# Patient Record
Sex: FEMALE | Race: BLACK OR AFRICAN AMERICAN | NOT HISPANIC OR LATINO | Employment: FULL TIME | URBAN - METROPOLITAN AREA
[De-identification: names, ages, dates, MRNs, and addresses within clinical notes are randomized per-mention and may not be internally consistent; named-entity substitution may affect disease eponyms.]

---

## 2018-06-12 ENCOUNTER — OFFICE VISIT (OUTPATIENT)
Dept: URGENT CARE | Facility: CLINIC | Age: 48
End: 2018-06-12
Payer: COMMERCIAL

## 2018-06-12 VITALS
WEIGHT: 178 LBS | HEIGHT: 68 IN | RESPIRATION RATE: 18 BRPM | SYSTOLIC BLOOD PRESSURE: 160 MMHG | OXYGEN SATURATION: 100 % | BODY MASS INDEX: 26.98 KG/M2 | DIASTOLIC BLOOD PRESSURE: 108 MMHG | HEART RATE: 107 BPM

## 2018-06-12 DIAGNOSIS — I10 HYPERTENSION, UNCONTROLLED: Primary | ICD-10-CM

## 2018-06-12 PROCEDURE — 3008F BODY MASS INDEX DOCD: CPT | Mod: CPTII,S$GLB,, | Performed by: FAMILY MEDICINE

## 2018-06-12 PROCEDURE — 99203 OFFICE O/P NEW LOW 30 MIN: CPT | Mod: S$GLB,,, | Performed by: FAMILY MEDICINE

## 2018-06-12 PROCEDURE — 3077F SYST BP >= 140 MM HG: CPT | Mod: CPTII,S$GLB,, | Performed by: FAMILY MEDICINE

## 2018-06-12 PROCEDURE — 3080F DIAST BP >= 90 MM HG: CPT | Mod: CPTII,S$GLB,, | Performed by: FAMILY MEDICINE

## 2018-06-12 RX ORDER — AMLODIPINE BESYLATE 5 MG/1
5 TABLET ORAL DAILY
COMMUNITY
End: 2018-06-12 | Stop reason: SDUPTHER

## 2018-06-12 RX ORDER — LOSARTAN POTASSIUM AND HYDROCHLOROTHIAZIDE 25; 100 MG/1; MG/1
1 TABLET ORAL DAILY
COMMUNITY
End: 2018-06-12 | Stop reason: SDUPTHER

## 2018-06-12 RX ORDER — AMLODIPINE BESYLATE 5 MG/1
5 TABLET ORAL DAILY
Qty: 10 TABLET | Refills: 0 | Status: SHIPPED | OUTPATIENT
Start: 2018-06-12 | End: 2018-06-22

## 2018-06-12 RX ORDER — ALPRAZOLAM 1 MG/1
1 TABLET ORAL 2 TIMES DAILY
COMMUNITY

## 2018-06-12 RX ORDER — LOSARTAN POTASSIUM AND HYDROCHLOROTHIAZIDE 25; 100 MG/1; MG/1
1 TABLET ORAL DAILY
Qty: 10 TABLET | Refills: 0 | Status: SHIPPED | OUTPATIENT
Start: 2018-06-12

## 2018-06-12 NOTE — PROGRESS NOTES
"Subjective:       Patient ID: Medina Barrett is a 47 y.o. female.    Vitals:  height is 5' 8" (1.727 m) and weight is 80.7 kg (178 lb). Her blood pressure is 160/108 (abnormal) and her pulse is 107. Her respiration is 18 and oxygen saturation is 100%.     Chief Complaint: Hypertension    Out of meds for 3 weeks - states her usual pcp went to [facility] only and she could not afford it, she could not get a refill either. She denies headache, sob, chest pain, blurred vision, extremity weakness, numbness, slurred speech. Her mother passed away 1 month ago so feeling very stressed about that, also has high stress job. Usually at home BP 170s/90s. She has an appt with a new PCP on June 20th at [facility].      Hypertension   Pertinent negatives include no blurred vision, chest pain, headaches or shortness of breath.     Review of Systems   Constitution: Negative for chills and fever.   HENT: Negative for sore throat.    Eyes: Negative for blurred vision.   Cardiovascular: Negative for chest pain.   Respiratory: Negative for shortness of breath.    Skin: Negative for rash.   Musculoskeletal: Negative for back pain and joint pain.   Gastrointestinal: Negative for abdominal pain, diarrhea, nausea and vomiting.   Neurological: Negative for headaches and light-headedness.   Psychiatric/Behavioral: The patient is nervous/anxious.        Objective:      Physical Exam   Constitutional: She is oriented to person, place, and time. She appears well-developed and well-nourished. She is cooperative.  Non-toxic appearance. She does not appear ill. No distress.   HENT:   Head: Normocephalic and atraumatic.   Right Ear: Hearing, tympanic membrane, external ear and ear canal normal. No middle ear effusion.   Left Ear: Hearing, tympanic membrane, external ear and ear canal normal.  No middle ear effusion.   Nose: Nose normal. No mucosal edema, rhinorrhea or nasal deformity. No epistaxis. Right sinus exhibits no maxillary sinus tenderness and no frontal " sinus tenderness. Left sinus exhibits no maxillary sinus tenderness and no frontal sinus tenderness.   Mouth/Throat: Uvula is midline, oropharynx is clear and moist and mucous membranes are normal. No trismus in the jaw. Normal dentition. No uvula swelling. No posterior oropharyngeal erythema.   Eyes: Conjunctivae, EOM and lids are normal. Pupils are equal, round, and reactive to light. Right eye exhibits no discharge. Left eye exhibits no discharge. No scleral icterus.   Sclera clear bilat   Neck: Trachea normal, normal range of motion, full passive range of motion without pain and phonation normal. Neck supple.   Cardiovascular: Normal rate, regular rhythm, normal heart sounds, intact distal pulses and normal pulses.    No murmur heard.  Pulmonary/Chest: Effort normal and breath sounds normal. No respiratory distress. She has no decreased breath sounds. She has no wheezes.   Abdominal: Soft. Normal appearance and bowel sounds are normal. She exhibits no distension, no pulsatile midline mass and no mass. There is no tenderness.   Musculoskeletal: Normal range of motion. She exhibits no edema or deformity.   Neurological: She is alert and oriented to person, place, and time. She has normal strength. No cranial nerve deficit or sensory deficit. She exhibits normal muscle tone. She displays a negative Romberg sign. Coordination and gait normal. GCS eye subscore is 4. GCS verbal subscore is 5. GCS motor subscore is 6.   Normal finger to nose, normal EOM, no nystagmus, normal sensation to face and extremities, CN intact,  strength equal bilateral, extremity strength equal bilaterally, no pronator drift   Skin: Skin is warm, dry and intact. She is not diaphoretic. No pallor.   Psychiatric: She has a normal mood and affect. Her speech is normal and behavior is normal. Judgment and thought content normal. Cognition and memory are normal.   Nursing note and vitals reviewed.      Assessment:       1. Hypertension,  uncontrolled        Plan:         Hypertension, uncontrolled  -     amLODIPine (NORVASC) 5 MG tablet; Take 1 tablet (5 mg total) by mouth once daily.  Dispense: 10 tablet; Refill: 0  -     losartan-hydrochlorothiazide 100-25 mg (HYZAAR) 100-25 mg per tablet; Take 1 tablet by mouth once daily.  Dispense: 10 tablet; Refill: 0    discussed bp log, diet, exercise  ER precautions discussed and pt understands    Patient Instructions     PLEASE READ YOUR DISCHARGE INSTRUCTIONS ENTIRELY AS IT CONTAINS IMPORTANT INFORMATION.    Take your blood pressure meds daily  Log your blood pressure at home - take at the same time everyday preferrably 1 hour after meds    Please go to the emergency room if you experience chest pain, shortness of breath, funny heart beats, headache, blurred vision, weakness in one arm or leg, slurred speech, numbness, inability to walk or talk, confusion.     Please keep your appt with your new doctor next week.    Eat low sodium diet, exercise.     You must understand that you have received an Urgent Care treatment only and that you may be released before all of your medical problems are known or treated.    Discharge Instructions: Taking Your Blood Pressure  Blood pressure is the force of blood as it moves from the heart through the blood vessels. You can take your own blood pressure reading using a digital monitor. Take readings as often as your healthcare provider instructs. Take your readings each time in the same way, using the same arm. Here are guidelines for taking your blood pressure.  The American Heart Association (AHA) recommends purchasing a blood pressure monitor that is validated and approved by the Association for the Advancement of Medical Instrumentation, the [place] Hypertension Society, and the International Protocol for the Validation of Automated BP Measuring Devices. If the blood pressure monitor is for a senior adult, a pregnant woman, or a child, make certain it is validated for  use with such a population. For the most reliable readings, the AHA recommends an automatic, cuff-style, upper arm (bicep) monitor. The readings from finger and wrist monitors are not as reliable as the upper arm monitor.        Step 1. Relax    · Wait at least a half hour after smoking, eating, or exercising. Do not drink coffee, tea, soda, or other caffeinated beverages before checking your blood pressure.   · Sit comfortably at a table. Place the monitor near you.  · Rest for a few minutes before you begin.        Step 2. Wrap the cuff    · Place your arm on the table, palm up. Put your arm in a position that is level with your heart. Wrap the cuff around your upper arm, about an inch above your elbow. Its best to wrap the cuff on bare skin, not over clothing.  · Make sure your cuff fits. If it doesnt wrap around your upper arm, order a larger cuff. A cuff that is too large or too small can result in an inaccurate blood pressure reading.           Step 3. Inflate the cuff    · Pump the cuff until the scale reads 200. If you have a self-inflating cuff, push the button that starts the pump.  · The cuff will tighten, then loosen.  · The numbers will change. When they stop changing, your blood pressure reading will appear.  · If you get a reading that is too high or too low for you, relax for a few minutes. Then do the test again.    Step 4. Write down the results  · Write down your blood pressure numbers. James the date and time. Keep your results in one place, such as a notebook.  · Remove the cuff from your arm. Turn off the machine.  · Take the readings with you to your medical appointments.  · If you start a new blood pressure medicine, or change a blood pressure medicine dose, note the day you started the new drug or dosage on your blood pressure recording sheet. This will help your healthcare provider monitor the effect of medication changes.     Date Last Reviewed: 4/27/2016  © 6517-3803 [employer]. [address] [place] [address], [place] [address]. All rights reserved. This information is not intended as a substitute for professional medical care. Always follow your healthcare professional's instructions.        Eating Heart-Healthy Foods  Eating has a big impact on your heart health. In fact, eating healthier can improve several of your heart risks at once. For instance, it helps you manage weight, cholesterol, and blood pressure. Here are ideas to help you make heart-healthy changes without giving up all the foods and flavors you love.  Getting started  · Talk with your health care provider about eating plans, such as the DASH or Mediterranean diet. You may also be referred to a dietitian.  · Change a few things at a time. Give yourself time to get used to a few eating changes before adding more.  · Work to create a tasty, healthy eating plan that you can stick to for the rest of your life.    Goals for healthy eating  Below are some tips to improve your eating habits:  · Limit saturated fats and trans fats. Saturated fats raise your levels of cholesterol, so keep these fats to a minimum. They are found in foods such as fatty meats, whole milk, cheese, and palm and coconut oils. Avoid trans fats because they lower good cholesterol as well as raise bad cholesterol. Trans fats are most often found in processed foods.  · Reduce sodium (salt) intake. Eating too much salt may increase your blood pressure. Limit your sodium intake to 2,300 milligrams (mg) per day, or less if your health care provider recommends it. Dining out less often and eating fewer processed foods are two great ways to decrease the amount of salt you consume.  · Managing calories. A calorie is a unit of energy. Your body burns calories for fuel, but if you eat more calories than your body burns, the extras are stored as fat. Your health care provider can help you create a diet plan to manage your calories. This will likely include  eating healthier foods as well as exercising regularly. To help you track your progress, keep a diary to record what you eat and how often you exercise.  Choose the right foods  Aim to make these foods staples of your diet. If you have diabetes, you may have different recommendations than what is listed here:  · Fruits and vegetable provide plenty of nutrients without a lot of calories. At meals, fill half your plate with these foods. Split the other half of your plate between whole grains and lean protein.  · Whole grains are high in fiber and rich in vitamins and nutrients. Good choices include whole-wheat bread, pasta, and brown rice.  · Lean proteins give you nutrition with less fat. Good choices include fish, skinless chicken, and beans.  · Low-fat or nonfat dairy provides nutrients without a lot of fat. Try low-fat or nonfat milk, cheese, or yogurt.  · Healthy fats can be good for you in small amounts. These are unsaturated fats, such as olive oil, nuts, and fish. Try to have at least 2 servings per week of fatty fish such as salmon, sardines, mackerel, rainbow trout, and albacore tuna. These contain omega-3 fatty acids, which are good for your heart. Flaxseed is another source of a heart-healthy fat.  More on heart healthy eating    Read food labels  Healthy eating starts at the grocery store. Be sure to pay attention to food labels on packaged foods. Look for products that are high in fiber and protein, and low in saturated fat, cholesterol, and sodium. Avoid products that contain trans fat. And pay close attention to serving size. For instance, if you plan to eat two servings, double all the numbers on the label.  Prepare food right  A key part of healthy cooking is cutting down on added fat and salt. Look on the internet for lower-fat, lower-sodium recipes. Also, try these tips:  · Remove fat from meat and skin from poultry before cooking.  · Skim fat from the surface of soups and sauces.  · Broilkassieil,  bake, steam, grill, and microwave food without added fats.  · Choose ingredients that spice up your food without adding calories, fat, or sodium. Try these items: horseradish, hot sauce, lemon, mustard, nonfat salad dressings, and vinegar. For salt-free herbs and spices, try basil, cilantro, cinnamon, pepper, and rosemary.  Date Last Reviewed: 6/25/2015 © 2000-2017 [employer]. [address] [place] [address]. All rights reserved. This information is not intended as a substitute for professional medical care. Always follow your healthcare professional's instructions.        Exercise for a Healthier Heart  You may wonder how you can improve the health of your heart. If youre thinking about exercise, youre on the right track. You dont need to become an athlete, but you do need a certain amount of brisk exercise to help strengthen your heart. If you have been diagnosed with a heart condition, your doctor may recommend exercise to help stabilize your condition. To help make exercise a habit, choose safe, fun activities.     Exercise with a friend. When activity is fun, you're more likely to stick with it.     Be sure to check with your healthcare provider before starting an exercise program.   Why exercise?  Exercising regularly offers many healthy rewards. It can help you do all of the following:  · Improve your blood cholesterol level to help prevent further heart trouble  · Lower your blood pressure to help prevent a stroke or heart attack  · Control diabetes, or reduce your risk of getting this disease  · Improve your heart and lung function  · Reach and maintain a healthy weight  · Make your muscles stronger and more limber so you can stay active  · Prevent falls and fractures by slowing the loss of bone mass (osteoporosis)  · Manage stress better  · Reduce your blood pressure  · Improve your sense of self and your body image  Exercise tips  Ease into your routine. Set small goals. Then  build on them.  Exercise on most days. Aim for a total of 150 or more minutes of moderate to  vigorous intensity activity each week. Consider 40 minutes, 3 to 4 times a week. For best results, activity should last for 40 minutes on average. It is OK to work up to the 40 minute period over time. Examples of moderate-intensity activity is walking 1 mile in 15 minutes or 30 to 45 minutes of yard work.  Step up your daily activity level. Along with your exercise program, try being more active throughout the day. Walk instead of drive. Do more household tasks or yard work.  Choose one or more activities you enjoy. Walking is one of the easiest things you can do. You can also try swimming, riding a bike, dancing, or taking an exercise class.  Stop exercising and call your doctor if you:  · Have chest pain or feel dizzy or lightheaded  · Feel burning, tightness, pressure, or heaviness in your chest, neck, shoulders, back, or arms  · Have unusual shortness of breath  · Have increased joint or muscle pain  · Have palpitations or an irregular heartbeat   Date Last Reviewed: 5/1/2016  © 8336-7555 [employer]. [address] [place] [address]. All rights reserved. This information is not intended as a substitute for professional medical care. Always follow your healthcare professional's instructions.        Step-by-Step  Checking Your Blood Pressure    Date Last Reviewed: 4/27/2016  © 7868-6597 [employer]. [address] [place] [address]. All rights reserved. This information is not intended as a substitute for professional medical care. Always follow your healthcare professional's instructions.

## 2018-06-12 NOTE — PATIENT INSTRUCTIONS
PLEASE READ YOUR DISCHARGE INSTRUCTIONS ENTIRELY AS IT CONTAINS IMPORTANT INFORMATION.    Take your blood pressure meds daily  Log your blood pressure at home - take at the same time everyday preferrably 1 hour after meds    Please go to the emergency room if you experience chest pain, shortness of breath, funny heart beats, headache, blurred vision, weakness in one arm or leg, slurred speech, numbness, inability to walk or talk, confusion.     Please keep your appt with your new doctor next week.    Eat low sodium diet, exercise.     You must understand that you have received an Urgent Care treatment only and that you may be released before all of your medical problems are known or treated.    Discharge Instructions: Taking Your Blood Pressure  Blood pressure is the force of blood as it moves from the heart through the blood vessels. You can take your own blood pressure reading using a digital monitor. Take readings as often as your healthcare provider instructs. Take your readings each time in the same way, using the same arm. Here are guidelines for taking your blood pressure.  The American Heart Association (AHA) recommends purchasing a blood pressure monitor that is validated and approved by the Association for the Advancement of Medical Instrumentation, the [place] Hypertension Society, and the International Protocol for the Validation of Automated BP Measuring Devices. If the blood pressure monitor is for a senior adult, a pregnant woman, or a child, make certain it is validated for use with such a population. For the most reliable readings, the AHA recommends an automatic, cuff-style, upper arm (bicep) monitor. The readings from finger and wrist monitors are not as reliable as the upper arm monitor.        Step 1. Relax    · Wait at least a half hour after smoking, eating, or exercising. Do not drink coffee, tea, soda, or other caffeinated beverages before checking your blood pressure.   · Sit comfortably at  a table. Place the monitor near you.  · Rest for a few minutes before you begin.        Step 2. Wrap the cuff    · Place your arm on the table, palm up. Put your arm in a position that is level with your heart. Wrap the cuff around your upper arm, about an inch above your elbow. Its best to wrap the cuff on bare skin, not over clothing.  · Make sure your cuff fits. If it doesnt wrap around your upper arm, order a larger cuff. A cuff that is too large or too small can result in an inaccurate blood pressure reading.           Step 3. Inflate the cuff    · Pump the cuff until the scale reads 200. If you have a self-inflating cuff, push the button that starts the pump.  · The cuff will tighten, then loosen.  · The numbers will change. When they stop changing, your blood pressure reading will appear.  · If you get a reading that is too high or too low for you, relax for a few minutes. Then do the test again.    Step 4. Write down the results  · Write down your blood pressure numbers. James the date and time. Keep your results in one place, such as a notebook.  · Remove the cuff from your arm. Turn off the machine.  · Take the readings with you to your medical appointments.  · If you start a new blood pressure medicine, or change a blood pressure medicine dose, note the day you started the new drug or dosage on your blood pressure recording sheet. This will help your healthcare provider monitor the effect of medication changes.     Date Last Reviewed: 4/27/2016  © 5569-3526 [employer]. [address] [place] [address], [place] [address]. All rights reserved. This information is not intended as a substitute for professional medical care. Always follow your healthcare professional's instructions.        Eating Heart-Healthy Foods  Eating has a big impact on your heart health. In fact, eating healthier can improve several of your heart risks at once. For instance, it helps you manage weight, cholesterol, and blood  pressure. Here are ideas to help you make heart-healthy changes without giving up all the foods and flavors you love.  Getting started  · Talk with your health care provider about eating plans, such as the DASH or Mediterranean diet. You may also be referred to a dietitian.  · Change a few things at a time. Give yourself time to get used to a few eating changes before adding more.  · Work to create a tasty, healthy eating plan that you can stick to for the rest of your life.    Goals for healthy eating  Below are some tips to improve your eating habits:  · Limit saturated fats and trans fats. Saturated fats raise your levels of cholesterol, so keep these fats to a minimum. They are found in foods such as fatty meats, whole milk, cheese, and palm and coconut oils. Avoid trans fats because they lower good cholesterol as well as raise bad cholesterol. Trans fats are most often found in processed foods.  · Reduce sodium (salt) intake. Eating too much salt may increase your blood pressure. Limit your sodium intake to 2,300 milligrams (mg) per day, or less if your health care provider recommends it. Dining out less often and eating fewer processed foods are two great ways to decrease the amount of salt you consume.  · Managing calories. A calorie is a unit of energy. Your body burns calories for fuel, but if you eat more calories than your body burns, the extras are stored as fat. Your health care provider can help you create a diet plan to manage your calories. This will likely include eating healthier foods as well as exercising regularly. To help you track your progress, keep a diary to record what you eat and how often you exercise.  Choose the right foods  Aim to make these foods staples of your diet. If you have diabetes, you may have different recommendations than what is listed here:  · Fruits and vegetable provide plenty of nutrients without a lot of calories. At meals, fill half your plate with these foods.  Split the other half of your plate between whole grains and lean protein.  · Whole grains are high in fiber and rich in vitamins and nutrients. Good choices include whole-wheat bread, pasta, and brown rice.  · Lean proteins give you nutrition with less fat. Good choices include fish, skinless chicken, and beans.  · Low-fat or nonfat dairy provides nutrients without a lot of fat. Try low-fat or nonfat milk, cheese, or yogurt.  · Healthy fats can be good for you in small amounts. These are unsaturated fats, such as olive oil, nuts, and fish. Try to have at least 2 servings per week of fatty fish such as salmon, sardines, mackerel, rainbow trout, and albacore tuna. These contain omega-3 fatty acids, which are good for your heart. Flaxseed is another source of a heart-healthy fat.  More on heart healthy eating    Read food labels  Healthy eating starts at the grocery store. Be sure to pay attention to food labels on packaged foods. Look for products that are high in fiber and protein, and low in saturated fat, cholesterol, and sodium. Avoid products that contain trans fat. And pay close attention to serving size. For instance, if you plan to eat two servings, double all the numbers on the label.  Prepare food right  A key part of healthy cooking is cutting down on added fat and salt. Look on the internet for lower-fat, lower-sodium recipes. Also, try these tips:  · Remove fat from meat and skin from poultry before cooking.  · Skim fat from the surface of soups and sauces.  · Broil, boil, bake, steam, grill, and microwave food without added fats.  · Choose ingredients that spice up your food without adding calories, fat, or sodium. Try these items: horseradish, hot sauce, lemon, mustard, nonfat salad dressings, and vinegar. For salt-free herbs and spices, try basil, cilantro, cinnamon, pepper, and rosemary.  Date Last Reviewed: 6/25/2015  © 6269-8105 [employer]. [address] [place] [address], [place] [address].  All rights reserved. This information is not intended as a substitute for professional medical care. Always follow your healthcare professional's instructions.        Exercise for a Healthier Heart  You may wonder how you can improve the health of your heart. If youre thinking about exercise, youre on the right track. You dont need to become an athlete, but you do need a certain amount of brisk exercise to help strengthen your heart. If you have been diagnosed with a heart condition, your doctor may recommend exercise to help stabilize your condition. To help make exercise a habit, choose safe, fun activities.     Exercise with a friend. When activity is fun, you're more likely to stick with it.     Be sure to check with your healthcare provider before starting an exercise program.   Why exercise?  Exercising regularly offers many healthy rewards. It can help you do all of the following:  · Improve your blood cholesterol level to help prevent further heart trouble  · Lower your blood pressure to help prevent a stroke or heart attack  · Control diabetes, or reduce your risk of getting this disease  · Improve your heart and lung function  · Reach and maintain a healthy weight  · Make your muscles stronger and more limber so you can stay active  · Prevent falls and fractures by slowing the loss of bone mass (osteoporosis)  · Manage stress better  · Reduce your blood pressure  · Improve your sense of self and your body image  Exercise tips  Ease into your routine. Set small goals. Then build on them.  Exercise on most days. Aim for a total of 150 or more minutes of moderate to  vigorous intensity activity each week. Consider 40 minutes, 3 to 4 times a week. For best results, activity should last for 40 minutes on average. It is OK to work up to the 40 minute period over time. Examples of moderate-intensity activity is walking 1 mile in 15 minutes or 30 to 45 minutes of yard work.  Step up your daily activity level.  Along with your exercise program, try being more active throughout the day. Walk instead of drive. Do more household tasks or yard work.  Choose one or more activities you enjoy. Walking is one of the easiest things you can do. You can also try swimming, riding a bike, dancing, or taking an exercise class.  Stop exercising and call your doctor if you:  · Have chest pain or feel dizzy or lightheaded  · Feel burning, tightness, pressure, or heaviness in your chest, neck, shoulders, back, or arms  · Have unusual shortness of breath  · Have increased joint or muscle pain  · Have palpitations or an irregular heartbeat   Date Last Reviewed: 5/1/2016  © 4681-4841 [employer]. [address] [place] [address]. All rights reserved. This information is not intended as a substitute for professional medical care. Always follow your healthcare professional's instructions.        Step-by-Step  Checking Your Blood Pressure    Date Last Reviewed: 4/27/2016  © 8617-2783 [employer]. [address] [place] [address]. All rights reserved. This information is not intended as a substitute for professional medical care. Always follow your healthcare professional's instructions.

## 2018-06-14 ENCOUNTER — TELEPHONE (OUTPATIENT)
Dept: URGENT CARE | Facility: CLINIC | Age: 48
End: 2018-06-14

## 2019-10-25 LAB
HBV SURFACE AG SERPL QL IA: NEGATIVE
HCV AB SER-ACNC: NEGATIVE
HEPATITIS A IGM: NEGATIVE
HEPATITIS B CORE AB IGM: NEGATIVE
HIV-1/HIV-2 AB SCREEN: NORMAL

## 2021-04-01 ENCOUNTER — IMMUNIZATION (OUTPATIENT)
Dept: INTERNAL MEDICINE | Facility: CLINIC | Age: 51
End: 2021-04-01
Payer: COMMERCIAL

## 2021-04-01 DIAGNOSIS — Z23 NEED FOR VACCINATION: Primary | ICD-10-CM

## 2021-04-01 PROCEDURE — 0011A COVID-19, MRNA, LNP-S, PF, 100 MCG/0.5 ML DOSE VACCINE: ICD-10-PCS | Mod: CV19,S$GLB,, | Performed by: INTERNAL MEDICINE

## 2021-04-01 PROCEDURE — 0011A COVID-19, MRNA, LNP-S, PF, 100 MCG/0.5 ML DOSE VACCINE: CPT | Mod: CV19,S$GLB,, | Performed by: INTERNAL MEDICINE

## 2021-04-01 PROCEDURE — 91301 COVID-19, MRNA, LNP-S, PF, 100 MCG/0.5 ML DOSE VACCINE: CPT | Mod: S$GLB,,, | Performed by: INTERNAL MEDICINE

## 2021-04-01 PROCEDURE — 91301 COVID-19, MRNA, LNP-S, PF, 100 MCG/0.5 ML DOSE VACCINE: ICD-10-PCS | Mod: S$GLB,,, | Performed by: INTERNAL MEDICINE

## 2021-04-29 ENCOUNTER — IMMUNIZATION (OUTPATIENT)
Dept: INTERNAL MEDICINE | Facility: CLINIC | Age: 51
End: 2021-04-29
Payer: COMMERCIAL

## 2021-04-29 DIAGNOSIS — Z23 NEED FOR VACCINATION: Primary | ICD-10-CM

## 2021-04-29 PROCEDURE — 91301 COVID-19, MRNA, LNP-S, PF, 100 MCG/0.5 ML DOSE VACCINE: CPT | Mod: S$GLB,,, | Performed by: INTERNAL MEDICINE

## 2021-04-29 PROCEDURE — 91301 COVID-19, MRNA, LNP-S, PF, 100 MCG/0.5 ML DOSE VACCINE: ICD-10-PCS | Mod: S$GLB,,, | Performed by: INTERNAL MEDICINE

## 2021-04-29 PROCEDURE — 0012A COVID-19, MRNA, LNP-S, PF, 100 MCG/0.5 ML DOSE VACCINE: ICD-10-PCS | Mod: CV19,S$GLB,, | Performed by: INTERNAL MEDICINE

## 2021-04-29 PROCEDURE — 0012A COVID-19, MRNA, LNP-S, PF, 100 MCG/0.5 ML DOSE VACCINE: CPT | Mod: CV19,S$GLB,, | Performed by: INTERNAL MEDICINE

## 2021-11-30 ENCOUNTER — IMMUNIZATION (OUTPATIENT)
Dept: INTERNAL MEDICINE | Facility: CLINIC | Age: 51
End: 2021-11-30
Payer: COMMERCIAL

## 2021-11-30 DIAGNOSIS — Z23 NEED FOR VACCINATION: Primary | ICD-10-CM

## 2021-11-30 PROCEDURE — 0064A COVID-19, MRNA, LNP-S, PF, 100 MCG/0.25 ML DOSE VACCINE (MODERNA BOOSTER): CPT | Mod: PBBFAC

## 2023-12-11 LAB
CHOLESTEROL, TOTAL: 262 (ref 100–199)
HDLC SERPL-MCNC: 102 MG/DL
LDLC SERPL CALC-MCNC: 145 MG/DL (ref 0–99)
TRIGL SERPL-MCNC: 90 MG/DL (ref 0–149)
VLDL CHOLESTEROL: 15 MG/DL (ref 5–40)

## 2024-02-21 LAB — CRC RECOMMENDATION EXT: NORMAL

## 2024-03-06 LAB
HPV MRNA E6/E7: NOT DETECTED
PAP RECOMMENDATION EXT: NORMAL
PAP SMEAR: NORMAL

## 2024-03-14 ENCOUNTER — PATIENT OUTREACH (OUTPATIENT)
Dept: ADMINISTRATIVE | Facility: HOSPITAL | Age: 54
End: 2024-03-14
Payer: COMMERCIAL

## 2024-03-14 RX ORDER — CLINDAMYCIN PHOSPHATE 10 MG/G
GEL TOPICAL
COMMUNITY
Start: 2023-11-28

## 2024-03-14 RX ORDER — HYDROCORTISONE 25 MG/G
OINTMENT TOPICAL
COMMUNITY
Start: 2023-11-28

## 2024-03-14 RX ORDER — CLINDAMYCIN HYDROCHLORIDE 300 MG/1
CAPSULE ORAL
COMMUNITY
Start: 2023-11-15

## 2024-03-14 RX ORDER — METOPROLOL SUCCINATE 100 MG/1
TABLET, EXTENDED RELEASE ORAL
COMMUNITY
Start: 2023-09-26

## 2024-03-14 RX ORDER — FLUOCINONIDE TOPICAL SOLUTION USP, 0.05% 0.5 MG/ML
SOLUTION TOPICAL
COMMUNITY
Start: 2023-06-18

## 2024-03-14 RX ORDER — TRAZODONE HYDROCHLORIDE 50 MG/1
1 TABLET ORAL NIGHTLY
COMMUNITY
Start: 2023-10-05

## 2024-03-14 RX ORDER — KETOCONAZOLE 20 MG/ML
SHAMPOO, SUSPENSION TOPICAL
COMMUNITY
Start: 2023-12-13

## 2024-03-14 RX ORDER — ALPRAZOLAM 0.5 MG/1
0.5 TABLET ORAL 3 TIMES DAILY PRN
COMMUNITY
Start: 2023-09-26

## 2024-03-14 RX ORDER — HYDROQUINONE 40 MG/G
CREAM TOPICAL
COMMUNITY
Start: 2024-02-22

## 2024-03-14 RX ORDER — LOSARTAN POTASSIUM 100 MG/1
100 TABLET ORAL NIGHTLY
COMMUNITY
Start: 2024-02-15

## 2024-03-14 RX ORDER — DOXYCYCLINE 100 MG/1
100 CAPSULE ORAL
COMMUNITY
Start: 2023-09-29

## 2024-03-14 RX ORDER — VILAZODONE HYDROCHLORIDE 40 MG/1
1 TABLET ORAL DAILY
COMMUNITY
Start: 2023-11-02

## 2024-03-14 RX ORDER — ERGOCALCIFEROL 1.25 MG/1
CAPSULE ORAL
COMMUNITY
Start: 2024-02-13

## 2024-03-14 RX ORDER — AMLODIPINE BESYLATE 10 MG/1
1 TABLET ORAL 2 TIMES DAILY
COMMUNITY
Start: 2023-09-26

## 2024-03-14 RX ORDER — CHOLECALCIFEROL (VITAMIN D3) 25 MCG
2 TABLET ORAL DAILY
COMMUNITY

## 2024-03-14 RX ORDER — CETIRIZINE HYDROCHLORIDE 10 MG/1
10 TABLET ORAL NIGHTLY
COMMUNITY
Start: 2023-12-19

## 2024-03-14 RX ORDER — LOSARTAN POTASSIUM 100 MG/1
1 TABLET ORAL NIGHTLY
COMMUNITY
Start: 2022-12-02

## 2024-03-14 RX ORDER — CLONIDINE HYDROCHLORIDE 0.2 MG/1
TABLET ORAL
COMMUNITY
Start: 2024-02-28

## 2024-03-14 RX ORDER — CLONIDINE HYDROCHLORIDE 0.2 MG/1
1 TABLET ORAL 2 TIMES DAILY
COMMUNITY
Start: 2023-06-23

## 2024-03-14 RX ORDER — VILAZODONE HYDROCHLORIDE 40 MG/1
40 TABLET ORAL
COMMUNITY
Start: 2023-12-04

## 2024-03-14 RX ORDER — MUPIROCIN 20 MG/G
OINTMENT TOPICAL
COMMUNITY
Start: 2023-11-24

## 2024-03-14 RX ORDER — ALPRAZOLAM 0.5 MG/1
0.5 TABLET ORAL 3 TIMES DAILY
COMMUNITY
Start: 2024-02-23

## 2024-03-14 RX ORDER — TRAZODONE HYDROCHLORIDE 50 MG/1
50 TABLET ORAL NIGHTLY
COMMUNITY
Start: 2023-12-11

## 2024-03-14 RX ORDER — AMLODIPINE BESYLATE 10 MG/1
TABLET ORAL
COMMUNITY
Start: 2024-02-28

## 2024-03-14 RX ORDER — SPIRONOLACTONE 100 MG/1
TABLET, FILM COATED ORAL
COMMUNITY
Start: 2024-03-08

## 2024-03-14 RX ORDER — CETIRIZINE HYDROCHLORIDE 10 MG/1
1 TABLET ORAL NIGHTLY
COMMUNITY
Start: 2023-07-05

## 2024-03-14 RX ORDER — METOPROLOL SUCCINATE 100 MG/1
100 TABLET, EXTENDED RELEASE ORAL
COMMUNITY
Start: 2023-09-26

## 2024-03-14 RX ORDER — ESCITALOPRAM OXALATE 20 MG/1
TABLET ORAL
COMMUNITY
Start: 2024-03-14

## 2024-04-19 ENCOUNTER — TELEPHONE (OUTPATIENT)
Dept: FAMILY MEDICINE | Facility: CLINIC | Age: 54
End: 2024-04-19
Payer: COMMERCIAL

## 2024-06-21 ENCOUNTER — OFFICE VISIT (OUTPATIENT)
Dept: FAMILY MEDICINE | Facility: CLINIC | Age: 54
End: 2024-06-21
Payer: COMMERCIAL

## 2024-06-21 ENCOUNTER — LAB VISIT (OUTPATIENT)
Dept: LAB | Facility: HOSPITAL | Age: 54
End: 2024-06-21
Attending: FAMILY MEDICINE
Payer: COMMERCIAL

## 2024-06-21 VITALS
TEMPERATURE: 98 F | BODY MASS INDEX: 27.46 KG/M2 | DIASTOLIC BLOOD PRESSURE: 96 MMHG | HEIGHT: 69 IN | SYSTOLIC BLOOD PRESSURE: 120 MMHG | WEIGHT: 185.44 LBS | HEART RATE: 73 BPM | OXYGEN SATURATION: 99 %

## 2024-06-21 DIAGNOSIS — M25.561 CHRONIC PAIN OF RIGHT KNEE: ICD-10-CM

## 2024-06-21 DIAGNOSIS — I10 HYPERTENSION, UNCONTROLLED: ICD-10-CM

## 2024-06-21 DIAGNOSIS — Z00.00 ANNUAL PHYSICAL EXAM: ICD-10-CM

## 2024-06-21 DIAGNOSIS — Z23 NEED FOR SHINGLES VACCINE: ICD-10-CM

## 2024-06-21 DIAGNOSIS — Z00.00 ANNUAL PHYSICAL EXAM: Primary | ICD-10-CM

## 2024-06-21 DIAGNOSIS — G89.29 CHRONIC PAIN OF RIGHT KNEE: ICD-10-CM

## 2024-06-21 LAB
ALBUMIN SERPL BCP-MCNC: 3.9 G/DL (ref 3.5–5.2)
ALP SERPL-CCNC: 51 U/L (ref 55–135)
ALT SERPL W/O P-5'-P-CCNC: 76 U/L (ref 10–44)
ANION GAP SERPL CALC-SCNC: 8 MMOL/L (ref 8–16)
AST SERPL-CCNC: 42 U/L (ref 10–40)
BASOPHILS # BLD AUTO: 0.02 K/UL (ref 0–0.2)
BASOPHILS NFR BLD: 0.6 % (ref 0–1.9)
BILIRUB SERPL-MCNC: 0.7 MG/DL (ref 0.1–1)
BUN SERPL-MCNC: 11 MG/DL (ref 6–20)
CALCIUM SERPL-MCNC: 9.3 MG/DL (ref 8.7–10.5)
CHLORIDE SERPL-SCNC: 102 MMOL/L (ref 95–110)
CHOLEST SERPL-MCNC: 232 MG/DL (ref 120–199)
CHOLEST/HDLC SERPL: 3.2 {RATIO} (ref 2–5)
CO2 SERPL-SCNC: 24 MMOL/L (ref 23–29)
CREAT SERPL-MCNC: 1.1 MG/DL (ref 0.5–1.4)
DIFFERENTIAL METHOD BLD: ABNORMAL
EOSINOPHIL # BLD AUTO: 0 K/UL (ref 0–0.5)
EOSINOPHIL NFR BLD: 1.1 % (ref 0–8)
ERYTHROCYTE [DISTWIDTH] IN BLOOD BY AUTOMATED COUNT: 12.9 % (ref 11.5–14.5)
EST. GFR  (NO RACE VARIABLE): >60 ML/MIN/1.73 M^2
ESTIMATED AVG GLUCOSE: 114 MG/DL (ref 68–131)
GLUCOSE SERPL-MCNC: 110 MG/DL (ref 70–110)
HBA1C MFR BLD: 5.6 % (ref 4–5.6)
HCT VFR BLD AUTO: 37.9 % (ref 37–48.5)
HDLC SERPL-MCNC: 73 MG/DL (ref 40–75)
HDLC SERPL: 31.5 % (ref 20–50)
HGB BLD-MCNC: 11.9 G/DL (ref 12–16)
IMM GRANULOCYTES # BLD AUTO: 0.01 K/UL (ref 0–0.04)
IMM GRANULOCYTES NFR BLD AUTO: 0.3 % (ref 0–0.5)
LDLC SERPL CALC-MCNC: 140.2 MG/DL (ref 63–159)
LYMPHOCYTES # BLD AUTO: 1.5 K/UL (ref 1–4.8)
LYMPHOCYTES NFR BLD: 43 % (ref 18–48)
MCH RBC QN AUTO: 29 PG (ref 27–31)
MCHC RBC AUTO-ENTMCNC: 31.4 G/DL (ref 32–36)
MCV RBC AUTO: 92 FL (ref 82–98)
MONOCYTES # BLD AUTO: 0.4 K/UL (ref 0.3–1)
MONOCYTES NFR BLD: 12.4 % (ref 4–15)
NEUTROPHILS # BLD AUTO: 1.5 K/UL (ref 1.8–7.7)
NEUTROPHILS NFR BLD: 42.6 % (ref 38–73)
NONHDLC SERPL-MCNC: 159 MG/DL
NRBC BLD-RTO: 1 /100 WBC
PLATELET # BLD AUTO: 404 K/UL (ref 150–450)
PMV BLD AUTO: 10.3 FL (ref 9.2–12.9)
POTASSIUM SERPL-SCNC: 4.1 MMOL/L (ref 3.5–5.1)
PROT SERPL-MCNC: 7.5 G/DL (ref 6–8.4)
RBC # BLD AUTO: 4.11 M/UL (ref 4–5.4)
SODIUM SERPL-SCNC: 134 MMOL/L (ref 136–145)
TRIGL SERPL-MCNC: 94 MG/DL (ref 30–150)
TSH SERPL DL<=0.005 MIU/L-ACNC: 1.34 UIU/ML (ref 0.4–4)
WBC # BLD AUTO: 3.56 K/UL (ref 3.9–12.7)

## 2024-06-21 PROCEDURE — 83036 HEMOGLOBIN GLYCOSYLATED A1C: CPT | Performed by: FAMILY MEDICINE

## 2024-06-21 PROCEDURE — 84443 ASSAY THYROID STIM HORMONE: CPT | Performed by: FAMILY MEDICINE

## 2024-06-21 PROCEDURE — 80061 LIPID PANEL: CPT | Performed by: FAMILY MEDICINE

## 2024-06-21 PROCEDURE — 85025 COMPLETE CBC W/AUTO DIFF WBC: CPT | Performed by: FAMILY MEDICINE

## 2024-06-21 PROCEDURE — 80053 COMPREHEN METABOLIC PANEL: CPT | Performed by: FAMILY MEDICINE

## 2024-06-21 PROCEDURE — 36415 COLL VENOUS BLD VENIPUNCTURE: CPT | Mod: PO | Performed by: FAMILY MEDICINE

## 2024-06-21 PROCEDURE — 99999 PR PBB SHADOW E&M-EST. PATIENT-LVL V: CPT | Mod: PBBFAC,,, | Performed by: FAMILY MEDICINE

## 2024-06-21 RX ORDER — SPIRONOLACTONE 100 MG/1
100 TABLET, FILM COATED ORAL DAILY
Qty: 90 TABLET | Refills: 1 | Status: SHIPPED | OUTPATIENT
Start: 2024-06-21

## 2024-06-21 RX ORDER — AMLODIPINE BESYLATE 10 MG/1
10 TABLET ORAL DAILY
Qty: 90 TABLET | Refills: 1 | Status: SHIPPED | OUTPATIENT
Start: 2024-06-21

## 2024-06-21 RX ORDER — VALSARTAN AND HYDROCHLOROTHIAZIDE 160; 12.5 MG/1; MG/1
1 TABLET, FILM COATED ORAL DAILY
Qty: 90 TABLET | Refills: 3 | Status: SHIPPED | OUTPATIENT
Start: 2024-06-21 | End: 2025-06-21

## 2024-06-21 NOTE — PROGRESS NOTES
"Routine Office Visit    Medina Ryan  1970  [id number]      Subjective     Medina is a 53 y.o. 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 She now has intermittent episodes of knee pain and swelling   Hypertension - Patient states she needs to have discussion about her blood pressure. She was prescribed metoprolol, amlodipine, losartan, hctz, clonidine and spirinolactone. She takes medication sometimes. She has white coat hypertension and thinks the doctor kept prescribing medication but when she takes all medications, she felt weak and dizzy.     Objective     Review of Systems   Constitutional:  Negative for chills and fever.   HENT:  Negative for congestion.    Eyes:  Negative for blurred vision.   Respiratory:  Negative for cough.    Cardiovascular:  Negative for chest pain.   Gastrointestinal:  Negative for abdominal pain, constipation, diarrhea, heartburn, nausea and vomiting.   Genitourinary:  Negative for dysuria.   Musculoskeletal:  Negative for myalgias.   Skin:  Negative for itching and rash.   Neurological:  Negative for dizziness and headaches.   Psychiatric/Behavioral:  Negative for depression.      BP (!) 120/96   Pulse 73   Temp 98.3 °F (36.8 °C) (Oral)   Ht 5' 9" (1.753 m)   Wt 84.1 kg (185 lb 6.5 oz)   SpO2 99%   BMI 27.38 kg/m²   Physical Exam  Constitutional:       Appearance: She is well-developed.   HENT:      Head: Normocephalic and atraumatic.   Eyes:      Conjunctiva/sclera: Conjunctivae normal.      Pupils: Pupils are equal, round, and reactive to light.   Cardiovascular:      Rate and Rhythm: Normal rate and regular rhythm.      Heart sounds: Normal heart sounds. No murmur heard.     No friction rub. No gallop.   Pulmonary:      Effort: No respiratory distress.      Breath sounds: " Normal breath sounds.   Abdominal:      General: Bowel sounds are normal. There is no distension.      Palpations: Abdomen is soft.      Tenderness: There is no abdominal tenderness.   Musculoskeletal:         General: Normal range of motion.      Cervical back: Normal range of motion and neck supple.   Lymphadenopathy:      Cervical: No cervical adenopathy.   Skin:     General: Skin is warm.   Neurological:      Mental Status: She is alert and oriented to person, place, and time.             Assessment     Health Maintenance         Date Due Completion Date    TETANUS VACCINE Never done ---    Hemoglobin A1c (Diabetic Prevention Screening) Never done ---    Shingles Vaccine (1 of 2) Never done ---    COVID-19 Vaccine (4 - 2023-24 season) 09/01/2023 11/30/2021    Mammogram 01/18/2024 1/18/2023    Influenza Vaccine (Season Ended) 09/01/2024 12/2/2022    Lipid Panel 12/11/2028 12/11/2023    Colorectal Cancer Screening 02/21/2029 2/21/2024    Cervical Cancer Screening 03/06/2029 3/6/2024              Problem List Items Addressed This Visit    None  Visit Diagnoses       Annual physical exam    -  Primary    Relevant Orders    CBC Auto Differential    Lipid Panel    Comprehensive Metabolic Panel    TSH    Hemoglobin A1C  I addressed all major concerns as it related to health maintenance.  All were ordered and scheduled based on the patients wishes.  Any additional health maintenance will be readdressed at the next physical if declined or deferred by the patient.       Modified visit - topics discussed outside of annual physical    ROS: lightheadedness   PE: as above   Hypertension, uncontrolled        Relevant Medications    amLODIPine (NORVASC) 10 MG tablet    spironolactone (ALDACTONE) 100 MG tablet    valsartan-hydrochlorothiazide (DIOVAN-HCT) 160-12.5 mg per tablet  Stop metoprolol   Stop clonidine   Bp check in 2 weeks       Need for shingles vaccine        Relevant Medications    varicella-zoster gE vac,2 of 2  SusR    Chronic pain of right knee        Relevant Orders    Ambulatory referral/consult to Orthopedics                  Follow up in about 6 months (around 12/21/2024), or if symptoms worsen or fail to improve.

## 2024-06-23 DIAGNOSIS — M25.561 RIGHT KNEE PAIN, UNSPECIFIED CHRONICITY: Primary | ICD-10-CM

## 2024-08-09 ENCOUNTER — E-VISIT (OUTPATIENT)
Dept: FAMILY MEDICINE | Facility: CLINIC | Age: 54
End: 2024-08-09
Payer: COMMERCIAL

## 2024-08-09 DIAGNOSIS — I10 HYPERTENSION, UNCONTROLLED: Primary | ICD-10-CM

## 2024-08-09 RX ORDER — ACETAMINOPHEN 500 MG
1 TABLET ORAL DAILY
Qty: 1 EACH | Refills: 0 | Status: SHIPPED | OUTPATIENT
Start: 2024-08-09 | End: 2024-08-12

## 2024-08-09 RX ORDER — OLMESARTAN MEDOXOMIL 20 MG/1
20 TABLET ORAL DAILY
Qty: 30 TABLET | Refills: 3 | Status: SHIPPED | OUTPATIENT
Start: 2024-08-09 | End: 2025-08-09

## 2024-08-09 NOTE — PROGRESS NOTES
Patient ID: Medina Ryan is a 53 y.o. female.    Chief Complaint: General Illness (Entered automatically based on patient selection in [facility].)    The patient initiated a request through [facility] on 2024 for evaluation and management with a chief complaint of General Illness (Entered automatically based on patient selection in [facility].)     I evaluated the questionnaire submission on 2024.    [facility] P[facility] Evisit [facility]-Medication    2024 10:14 AM CDT - Filed by Patient   What do you need help with? Other Concern   Do you agree to participate in an E-Visit? Yes   If you have any of the following symptoms, please present to your local emergency room or call 911:  I acknowledge   Are you pregnant, could you be pregnant, or are you breast feeding? None of the above   What is the main issue you would like addressed today? Something in Valsartan is working   Please describe your symptoms Since I started this meditation Ive been itching.   Where is your problem located? All over   How severe are your symptoms? Moderate   Have you had these symptoms before? No   How long have you been having these symptoms? For one to four weeks   Please list any medications or treatments you have used for your condition and indicate if it was effective or not. Cetetrine   What makes this feel better? Taking a shower   What makes this feel worse? Really annoying   Are these symptoms related to a condition that you currently have? No   Please describe any probable cause for these symptoms Not sure   Provide any additional information you feel is important.    Please attach any relevant images or files    Are you able to take your vital signs? No         No diagnosis found.     No orders of the defined types were placed in this encounter.     Medications Ordered This Encounter   Medications    blood pressure monitor Kit     Si kit by Misc.(Non-Drug; Combo Route) route Daily.     Dispense:  1 each     Refill:  0     olmesartan (BENICAR) 20 MG tablet     Sig: Take 1 tablet (20 mg total) by mouth once daily.     Dispense:  30 tablet     Refill:  3     .      Advised to monitor bp   Take for BP >140/90    Follow up if symptoms worsen or fail to improve.      E-Visit Time Tracking:    Day 1 Time (in minutes): 4  Day 2 Time (in minutes): 2    Total Time (in minutes): 6

## 2024-08-10 NOTE — TELEPHONE ENCOUNTER
Refill Routing Note   Medication(s) are not appropriate for processing by [facility] for the following reason(s):        Pharmacy requesting test strips and lancets; DEFER TO PCP    [facility] action(s):  Defer               Appointments  past 12m or future 3m with PCP    Date Provider   Last Visit   8/9/2024 Taylor Peters MD   Next Visit   1/6/2025 Tayolr Peters MD   ED visits in past 90 days: 0        Note composed:10:07 AM 08/10/2024

## 2024-08-12 VITALS — DIASTOLIC BLOOD PRESSURE: 70 MMHG | SYSTOLIC BLOOD PRESSURE: 125 MMHG

## 2024-08-12 RX ORDER — BLOOD-GLUCOSE METER
EACH MISCELLANEOUS
Qty: 1 EACH | Refills: 0 | Status: SHIPPED | OUTPATIENT
Start: 2024-08-12

## 2024-08-19 ENCOUNTER — E-VISIT (OUTPATIENT)
Dept: FAMILY MEDICINE | Facility: CLINIC | Age: 54
End: 2024-08-19
Payer: COMMERCIAL

## 2024-08-19 DIAGNOSIS — I10 HYPERTENSION, UNCONTROLLED: Primary | ICD-10-CM

## 2024-08-19 NOTE — PROGRESS NOTES
Patient ID: Medina Ryan is a 53 y.o. female.    Chief Complaint: General Illness (Entered automatically based on patient selection in [facility].)    The patient initiated a request through [employer] on 8/19/2024 for evaluation and management with a chief complaint of General Illness (Entered automatically based on patient selection in [employer].)     I evaluated the questionnaire submission on 8/19/2024.    [facility] Peq Evisit [facility]-Medication    8/19/2024 10:29 AM CDT - Filed by Patient   What do you need help with? Medication Management   Do you agree to participate in an E-Visit? Yes   If you have any of the following symptoms, please present to your local emergency room or call 911:  I acknowledge   Medication requests for narcotics will not be addressed via an E-Visit.  Please schedule an appointment. I acknowledge   Are you pregnant, could you be pregnant, or are you breast feeding? None of the above   Do you want to address a new or existing medication? I would like to address a medication I currently take   What is the main issue you would like addressed today? Acu-check   Would you like to change or continue your medication? Continue medication   What medication would you like to continue?  A question about what I really should be taking   Are you taking it as prescribed? Yes    What medical condition is the  medication intended to treat? High Blood pressure   Is the medication helping your condition? I don't know   Are you having any side effects from the medication? No   Provide any additional information you feel is important. Pharmacist advised me taking these two Blood pressure medication will make my potassium rise.   Please attach any relevant images or files    Are you able to take your vital signs? No         Encounter Diagnosis   Name Primary?    Hypertension, uncontrolled Yes        No orders of the defined types were placed in this encounter.           No follow-ups on file.      E-Visit Time  Tracking:    Day 1 Time (in minutes): 7    Total Time (in minutes): 7

## 2024-08-21 ENCOUNTER — CLINICAL SUPPORT (OUTPATIENT)
Dept: FAMILY MEDICINE | Facility: CLINIC | Age: 54
End: 2024-08-21
Payer: COMMERCIAL

## 2024-08-21 DIAGNOSIS — Z23 NEED FOR SHINGLES VACCINE: Primary | ICD-10-CM

## 2024-08-21 PROCEDURE — 99999 PR PBB SHADOW E&M-EST. PATIENT-LVL I: CPT | Mod: PBBFAC,,,

## 2024-08-21 PROCEDURE — 90471 IMMUNIZATION ADMIN: CPT | Mod: S$GLB,,, | Performed by: FAMILY MEDICINE

## 2024-08-21 PROCEDURE — 90750 HZV VACC RECOMBINANT IM: CPT | Mod: S$GLB,,, | Performed by: FAMILY MEDICINE

## 2024-10-09 ENCOUNTER — E-VISIT (OUTPATIENT)
Dept: FAMILY MEDICINE | Facility: CLINIC | Age: 54
End: 2024-10-09
Payer: COMMERCIAL

## 2024-10-09 DIAGNOSIS — F32.A ANXIETY AND DEPRESSION: Primary | ICD-10-CM

## 2024-10-09 DIAGNOSIS — F41.9 ANXIETY AND DEPRESSION: Primary | ICD-10-CM

## 2024-10-09 RX ORDER — VENLAFAXINE HYDROCHLORIDE 75 MG/1
75 CAPSULE, EXTENDED RELEASE ORAL DAILY
Qty: 30 CAPSULE | Refills: 11 | Status: SHIPPED | OUTPATIENT
Start: 2024-10-09 | End: 2025-10-09

## 2024-10-09 NOTE — PROGRESS NOTES
"Patient ID: Medina Ryan is a 53 y.o. female.    Chief Complaint: General Illness (Entered automatically based on patient selection in [employer].)    The patient initiated a request through [employer] on 10/9/2024 for evaluation and management with a chief complaint of General Illness (Entered automatically based on patient selection in [employer].)     I evaluated the questionnaire submission on 10/9/2024.    [facility] Peq Evisit Supergroup-Medication    10/9/2024 11:41 AM CDT - Filed by Patient   What do you need help with? Anxiety   Do you agree to participate in an E-Visit? Yes   If you have any of the following symptoms, please present to your local emergency room or call 911:  I acknowledge   Medication requests for narcotics will not be addressed via an E-Visit.  Please schedule an appointment. I acknowledge   Are you pregnant, could you be pregnant, or are you breast feeding? None of the above   What is the main issue you would like addressed today? Replace my lexipro  Medication   Fear of embarrassment causes me to avoid doing things or speaking to people. No   I avoid activities in which I am the center of attention. Yes   Being embarrassed or looking stupid are among my worst fears. No   I would like to address: Medication for Anxiety or Depression   By selecting "I understand," you acknowledge that the answers that you provide for this questionnaire may not be immediately viewed by your provider or your care team. If you are personally dealing with suicidal thoughts or a crisis, please consider contacting your provider's office.  If your provider is not available, please consider taking action by: calling 911 or the National Suicide Prevention Lifeline any day, any time at 1-382.960.4875 or texting SIGNS to 533849 for 24/7 anonymous, free crisis counseling. I understand   Over the last 2 weeks, how often have you been bothered by the following problems?   Little interest or pleasure in doing things Nearly " every day   Feeling down, depressed, or hopeless Several days   PHQ-2 Score (range: 0 - 6) 4 (Further screening recommended)   Feeling nervous, anxious, on edge Nearly everyday   Not being able to stop or control worrying Several days   Worrying too much about different things Several days   Trouble relaxing Several days   Being so restless that its hard to sit still Not at all   Becoming easily annoyed or irritable Several days   Feeling afraid as if something awful might happen Not at all   If you marked you are experiencing any of the aforementioned problems, how difficult have these made it for you to do your work, take care of things at home, or get along with other people? Somewhat difficult   TOTAL SCORE: (range: 0 - 21) 7   Do you want to address a new or existing medication? I would like to start a new medication that I do not already take   What is the name of the medication that you would like to start? Not sure i was taking Laisha pro no longer because it wasnt working for me   Have you taken a similar medication in the past? No   Why are you requesting this particular medication? I need something new    What medical condition is the  medication intended to treat? Anxiety,  really stressed and depression   Provide any additional information you feel is important. Just dealing with alot of stress with my job & family   Please attach any relevant images or files    Are you able to take your vital signs? Yes   Systolic Blood Pressure: 125   Diastolic Blood Pressure: 70   Weight: 175   Height: 70   Pulse: 68   Temperature: 97   Respiration rate: 17   Pulse Oxygen: 92         Encounter Diagnosis   Name Primary?    Anxiety and depression Yes        No orders of the defined types were placed in this encounter.     Medications Ordered This Encounter   Medications    venlafaxine (EFFEXOR XR) 75 MG 24 hr capsule     Sig: Take 1 capsule (75 mg total) by mouth once daily.     Dispense:  30 capsule     Refill:  11         No follow-ups on file.      E-Visit Time Tracking:    Day 1 Time (in minutes): 7    Total Time (in minutes): 7

## 2024-12-15 DIAGNOSIS — I10 HYPERTENSION, UNCONTROLLED: ICD-10-CM

## 2024-12-15 NOTE — TELEPHONE ENCOUNTER
No care due was identified.  [facility] Embedded Care Due Messages. Reference number: [id number].   12/15/2024 8:54:42 AM CST

## 2024-12-16 RX ORDER — OLMESARTAN MEDOXOMIL 20 MG/1
20 TABLET ORAL
Qty: 90 TABLET | Refills: 1 | Status: SHIPPED | OUTPATIENT
Start: 2024-12-16

## 2024-12-17 NOTE — TELEPHONE ENCOUNTER
Refill Decision Note   Medina Ryan  is requesting a refill authorization.  Brief Assessment and Rationale for Refill:  Approve     Medication Therapy Plan:  LOV 10/9/2024      Comments:     Note composed:7:01 PM 12/16/2024             Appointments     Last Visit   8/19/2024 Taylor Peters MD   Next Visit   1/6/2025 Taylor Peters MD

## 2025-01-06 ENCOUNTER — OFFICE VISIT (OUTPATIENT)
Dept: FAMILY MEDICINE | Facility: CLINIC | Age: 55
End: 2025-01-06
Payer: COMMERCIAL

## 2025-01-06 ENCOUNTER — LAB VISIT (OUTPATIENT)
Dept: LAB | Facility: HOSPITAL | Age: 55
End: 2025-01-06
Attending: FAMILY MEDICINE
Payer: COMMERCIAL

## 2025-01-06 VITALS
DIASTOLIC BLOOD PRESSURE: 100 MMHG | HEIGHT: 69 IN | OXYGEN SATURATION: 99 % | SYSTOLIC BLOOD PRESSURE: 150 MMHG | WEIGHT: 178.38 LBS | HEART RATE: 95 BPM | BODY MASS INDEX: 26.42 KG/M2 | TEMPERATURE: 98 F

## 2025-01-06 DIAGNOSIS — G89.29 CHRONIC PAIN OF RIGHT KNEE: ICD-10-CM

## 2025-01-06 DIAGNOSIS — M25.561 CHRONIC PAIN OF RIGHT KNEE: ICD-10-CM

## 2025-01-06 DIAGNOSIS — F32.A ANXIETY AND DEPRESSION: ICD-10-CM

## 2025-01-06 DIAGNOSIS — I10 HYPERTENSION, UNCONTROLLED: ICD-10-CM

## 2025-01-06 DIAGNOSIS — L30.9 ECZEMA, UNSPECIFIED TYPE: ICD-10-CM

## 2025-01-06 DIAGNOSIS — E78.5 HYPERLIPIDEMIA, UNSPECIFIED HYPERLIPIDEMIA TYPE: ICD-10-CM

## 2025-01-06 DIAGNOSIS — I10 HYPERTENSION, UNCONTROLLED: Primary | ICD-10-CM

## 2025-01-06 DIAGNOSIS — F41.9 ANXIETY AND DEPRESSION: ICD-10-CM

## 2025-01-06 LAB
ALBUMIN SERPL BCP-MCNC: 4.3 G/DL (ref 3.5–5.2)
ALP SERPL-CCNC: 57 U/L (ref 40–150)
ALT SERPL W/O P-5'-P-CCNC: 120 U/L (ref 10–44)
ANION GAP SERPL CALC-SCNC: 13 MMOL/L (ref 8–16)
AST SERPL-CCNC: 55 U/L (ref 10–40)
BILIRUB SERPL-MCNC: 0.5 MG/DL (ref 0.1–1)
BUN SERPL-MCNC: 19 MG/DL (ref 6–20)
CALCIUM SERPL-MCNC: 10.7 MG/DL (ref 8.7–10.5)
CHLORIDE SERPL-SCNC: 105 MMOL/L (ref 95–110)
CHOLEST SERPL-MCNC: 244 MG/DL (ref 120–199)
CHOLEST/HDLC SERPL: 3 {RATIO} (ref 2–5)
CO2 SERPL-SCNC: 22 MMOL/L (ref 23–29)
CREAT SERPL-MCNC: 1.2 MG/DL (ref 0.5–1.4)
EST. GFR  (NO RACE VARIABLE): 53.8 ML/MIN/1.73 M^2
GLUCOSE SERPL-MCNC: 107 MG/DL (ref 70–110)
HDLC SERPL-MCNC: 81 MG/DL (ref 40–75)
HDLC SERPL: 33.2 % (ref 20–50)
LDLC SERPL CALC-MCNC: 139.2 MG/DL (ref 63–159)
NONHDLC SERPL-MCNC: 163 MG/DL
POTASSIUM SERPL-SCNC: 4 MMOL/L (ref 3.5–5.1)
PROT SERPL-MCNC: 8.9 G/DL (ref 6–8.4)
SODIUM SERPL-SCNC: 140 MMOL/L (ref 136–145)
TRIGL SERPL-MCNC: 119 MG/DL (ref 30–150)
TSH SERPL DL<=0.005 MIU/L-ACNC: 2.91 UIU/ML (ref 0.4–4)

## 2025-01-06 PROCEDURE — 3080F DIAST BP >= 90 MM HG: CPT | Mod: CPTII,S$GLB,, | Performed by: FAMILY MEDICINE

## 2025-01-06 PROCEDURE — 1160F RVW MEDS BY RX/DR IN RCRD: CPT | Mod: CPTII,S$GLB,, | Performed by: FAMILY MEDICINE

## 2025-01-06 PROCEDURE — 99214 OFFICE O/P EST MOD 30 MIN: CPT | Mod: S$GLB,,, | Performed by: FAMILY MEDICINE

## 2025-01-06 PROCEDURE — 1159F MED LIST DOCD IN RCRD: CPT | Mod: CPTII,S$GLB,, | Performed by: FAMILY MEDICINE

## 2025-01-06 PROCEDURE — 3008F BODY MASS INDEX DOCD: CPT | Mod: CPTII,S$GLB,, | Performed by: FAMILY MEDICINE

## 2025-01-06 PROCEDURE — 80061 LIPID PANEL: CPT | Performed by: FAMILY MEDICINE

## 2025-01-06 PROCEDURE — 99999 PR PBB SHADOW E&M-EST. PATIENT-LVL III: CPT | Mod: PBBFAC,,, | Performed by: FAMILY MEDICINE

## 2025-01-06 PROCEDURE — 84443 ASSAY THYROID STIM HORMONE: CPT | Performed by: FAMILY MEDICINE

## 2025-01-06 PROCEDURE — 80053 COMPREHEN METABOLIC PANEL: CPT | Performed by: FAMILY MEDICINE

## 2025-01-06 PROCEDURE — 36415 COLL VENOUS BLD VENIPUNCTURE: CPT | Mod: PO | Performed by: FAMILY MEDICINE

## 2025-01-06 PROCEDURE — G2211 COMPLEX E/M VISIT ADD ON: HCPCS | Mod: S$GLB,,, | Performed by: FAMILY MEDICINE

## 2025-01-06 PROCEDURE — 4010F ACE/ARB THERAPY RXD/TAKEN: CPT | Mod: CPTII,S$GLB,, | Performed by: FAMILY MEDICINE

## 2025-01-06 PROCEDURE — 3077F SYST BP >= 140 MM HG: CPT | Mod: CPTII,S$GLB,, | Performed by: FAMILY MEDICINE

## 2025-01-06 RX ORDER — SPIRONOLACTONE 100 MG/1
100 TABLET, FILM COATED ORAL DAILY
Qty: 90 TABLET | Refills: 1 | Status: SHIPPED | OUTPATIENT
Start: 2025-01-06

## 2025-01-06 RX ORDER — TAZAROTENE 1 MG/G
CREAM TOPICAL NIGHTLY
COMMUNITY
Start: 2024-09-27

## 2025-01-06 RX ORDER — TRIAMCINOLONE ACETONIDE 1 MG/G
CREAM TOPICAL 2 TIMES DAILY
Qty: 28 G | Refills: 0 | Status: SHIPPED | OUTPATIENT
Start: 2025-01-06

## 2025-01-06 RX ORDER — IBUPROFEN 800 MG/1
800 TABLET ORAL EVERY 6 HOURS PRN
COMMUNITY
Start: 2024-10-31

## 2025-01-06 RX ORDER — OLMESARTAN MEDOXOMIL 20 MG/1
20 TABLET ORAL DAILY
Qty: 90 TABLET | Refills: 1 | Status: SHIPPED | OUTPATIENT
Start: 2025-01-06

## 2025-01-06 RX ORDER — VENLAFAXINE HYDROCHLORIDE 150 MG/1
150 CAPSULE, EXTENDED RELEASE ORAL DAILY
Qty: 90 CAPSULE | Refills: 3 | Status: SHIPPED | OUTPATIENT
Start: 2025-01-06 | End: 2026-01-06

## 2025-01-06 NOTE — PROGRESS NOTES
Routine Office Visit     Patient Name: Medina Ryan    : 1970  MRN: [id number]    Subjective     History of Present Illness    CHIEF COMPLAINT:  Hypertension - 6 month f/u   Right knee pain - chronic condition   Anxiety   Skin dermatitis / rash on face    HYPERTENSION:  She is currently on spironolactone and olmesartan for blood pressure management. She discontinued amlodipine due to hair thinning. She reports blood pressure elevations with certain foods, such as pork and pickles. She has not been exercising.    ANXIETY:  She experiences anxiety which may affect her blood pressure. She continues venlafaxine for management.    DERMATOLOGIC:  She reports skin discoloration that initially presented as a bug bite. Previous treatments including microdermabrasion provided minimal improvement. Hydroquinone was tried but found to be too strong. The condition is affecting her mood.    MEDICATIONS:  Current medications include spironolactone, olmesartan, venlafaxine, and biotin for hair and nail growth.    FAMILY HISTORY:  Her mother developed hypertension later in life. Her 39-year-old brother has a history of seizures and anxiety.      ROS:  General: no fever, no chills, no fatigue, no weight gain, no weight loss  Eyes: no vision changes, no redness, no discharge  ENT: no ear pain, no nasal congestion, no sore throat  Cardiovascular: no chest pain, no palpitations, no lower extremity edema  Respiratory: no cough, no shortness of breath  Gastrointestinal: no abdominal pain, no nausea, no vomiting, no diarrhea, no constipation, no blood in stool  Genitourinary: no dysuria, no hematuria, no frequency  Musculoskeletal: no joint pain, no muscle pain  Skin: no rash, no lesion  Neurological: no headache, no dizziness, no numbness, no tingling  Psychiatric: +anxiety, no depression, no sleep difficulty           Objective     BP (!) 150/100 (BP Location: Left arm, Patient Position: Sitting)   Pulse 95   Temp 98.3 °F  "(36.8 °C) (Oral)   Ht 5' 9" (1.753 m)   Wt 80.9 kg (178 lb 5.6 oz)   SpO2 99%   BMI 26.34 kg/m²   Physical Exam  Constitutional:       Appearance: She is well-developed.   HENT:      Head: Normocephalic and atraumatic.   Eyes:      Conjunctiva/sclera: Conjunctivae normal.      Pupils: Pupils are equal, round, and reactive to light.   Cardiovascular:      Rate and Rhythm: Normal rate and regular rhythm.      Heart sounds: Normal heart sounds. No murmur heard.     No friction rub. No gallop.   Pulmonary:      Effort: No respiratory distress.      Breath sounds: Normal breath sounds.   Abdominal:      General: Bowel sounds are normal. There is no distension.      Palpations: Abdomen is soft.      Tenderness: There is no abdominal tenderness.   Musculoskeletal:         General: Normal range of motion.      Cervical back: Normal range of motion and neck supple.   Lymphadenopathy:      Cervical: No cervical adenopathy.   Skin:     General: Skin is warm.          Neurological:      Mental Status: She is alert and oriented to person, place, and time.           Assessment     Assessment & Plan     Assessed blood pressure management, noting elevated readings (150/100) and potential risks of prolonged hypertension   Evaluated reported hair loss, considering potential causes including medication side effects, thyroid issues, and stress   Reviewed anxiety symptoms and current venlafaxine dosage, considering potential increase   Examined facial skin lesion, likely post-inflammatory hyperpigmentation from previous treatment attempts    HAIR LOSS:   Discussed various etiologies of hair loss, including medication side effects, thyroid dysfunction, weight loss, tension from zuleima, and stress.   Recommend the patient consider using biotin supplements for hair and nail growth.   Noted the patient reports hair thinning, possibly due to medication or other factors.    FOLLOW UP:   Follow up in 6 months with labs.         Problem " List Items Addressed This Visit          Psychiatric    Anxiety and depression    Relevant Medications    venlafaxine (EFFEXOR XR) 150 MG Cp24  ANXIETY:   Increased venlafaxine to 150 mg for anxiety management.   Acknowledged possibility of anxiety affecting blood pressure readings.   Noted the patient reports ongoing stress due to family health issues.         Cardiac/Vascular    Hyperlipidemia    Relevant Orders    Comprehensive Metabolic Panel    Lipid Panel    TSH    CBC Auto Differential    Comprehensive Metabolic Panel    Lipid Panel    Hemoglobin A1C    TSH   Planned to recheck cholesterol levels.   Ordered fasting lipid panel.      Hypertension, uncontrolled - Primary    Relevant Medications    spironolactone (ALDACTONE) 100 MG tablet    olmesartan (BENICAR) 20 MG tablet    Other Relevant Orders    Comprehensive Metabolic Panel    Lipid Panel    TSH    CBC Auto Differential    Comprehensive Metabolic Panel    Lipid Panel    Hemoglobin A1C    TSH   Explained risks associated with prolonged hypertension, including heart disease, myocardial infarction, cerebrovascular accidents, and renal complications.   Instructed the patient to monitor blood pressure at home regularly while on medications.   Continued olmesartan 20 mg, not currently on amlodipine    Advised the patient to take olmesartan in the morning.   Instructed the patient to contact the office in 1 week to report home blood pressure readings.   Noted current blood pressure is 150/100 mmHg, which is considered elevated.   Advised the patient to check blood pressure at home when not in a stressful situation.   Refilled spironolactone and olmesartan.   Suggested that if blood pressure decreases with exercise, the patient may be able to discontinue a medication.   Noted the patient observes certain foods trigger blood pressure increase.   Continued spironolactone.   Take spironolactone in the morning.   Recommend starting an exercise routine to potentially  lower blood pressure.         Orthopedic    Chronic pain of right knee    Relevant Medications    ibuprofen (ADVIL,MOTRIN) 800 MG tablet     Other Visit Diagnoses       Eczema, unspecified type        Relevant Medications    triamcinolone acetonide 0.1% (KENALOG) 0.1 % cream   Informed about potential skin lightening treatments and their effects, including risk of hypopigmentation.   Prescribed triamcinolone cream, to be applied daily (preferably during daytime) to facial lesion for 1 week.   Observed flat, discolored skin from a previous bite or scratch.   Recommend considering dermatologist consultation.                This note was generated with the assistance of ambient listening technology. Verbal consent was obtained by the patient and accompanying visitor(s) for the recording of patient appointment to facilitate this note. I attest to having reviewed and edited the generated note for accuracy, though some syntax or spelling errors may persist. Please contact the author of this note for any clarification.

## 2025-01-08 ENCOUNTER — PATIENT MESSAGE (OUTPATIENT)
Dept: FAMILY MEDICINE | Facility: CLINIC | Age: 55
End: 2025-01-08
Payer: COMMERCIAL

## 2025-01-08 DIAGNOSIS — E78.5 HYPERLIPIDEMIA, UNSPECIFIED HYPERLIPIDEMIA TYPE: Primary | ICD-10-CM

## 2025-01-16 ENCOUNTER — PATIENT MESSAGE (OUTPATIENT)
Dept: ADMINISTRATIVE | Facility: HOSPITAL | Age: 55
End: 2025-01-16
Payer: COMMERCIAL

## 2025-01-27 ENCOUNTER — E-VISIT (OUTPATIENT)
Dept: FAMILY MEDICINE | Facility: CLINIC | Age: 55
End: 2025-01-27
Payer: COMMERCIAL

## 2025-01-27 ENCOUNTER — PATIENT MESSAGE (OUTPATIENT)
Dept: FAMILY MEDICINE | Facility: CLINIC | Age: 55
End: 2025-01-27
Payer: COMMERCIAL

## 2025-01-27 VITALS — SYSTOLIC BLOOD PRESSURE: 126 MMHG | DIASTOLIC BLOOD PRESSURE: 87 MMHG

## 2025-01-27 DIAGNOSIS — F32.A ANXIETY AND DEPRESSION: Primary | ICD-10-CM

## 2025-01-27 DIAGNOSIS — F41.9 ANXIETY AND DEPRESSION: Primary | ICD-10-CM

## 2025-01-27 PROCEDURE — 99421 OL DIG E/M SVC 5-10 MIN: CPT | Mod: ,,, | Performed by: FAMILY MEDICINE

## 2025-01-31 ENCOUNTER — PATIENT MESSAGE (OUTPATIENT)
Dept: BEHAVIORAL HEALTH | Facility: CLINIC | Age: 55
End: 2025-01-31
Payer: COMMERCIAL

## 2025-01-31 ENCOUNTER — PATIENT MESSAGE (OUTPATIENT)
Dept: PSYCHIATRY | Facility: CLINIC | Age: 55
End: 2025-01-31
Payer: COMMERCIAL

## 2025-02-03 ENCOUNTER — PATIENT OUTREACH (OUTPATIENT)
Dept: ADMINISTRATIVE | Facility: HOSPITAL | Age: 55
End: 2025-02-03
Payer: COMMERCIAL

## 2025-02-04 ENCOUNTER — TELEPHONE (OUTPATIENT)
Dept: BEHAVIORAL HEALTH | Facility: CLINIC | Age: 55
End: 2025-02-04
Payer: COMMERCIAL

## 2025-02-04 NOTE — PROGRESS NOTES
CHW reached out to pt per Lilli Chew, PhD since patient reportedly was having technical issues in regards to her virtual [facility] intake on 2/6/2025 at 1:00pm. No answer, LVM and sent portal message to pls call.

## 2025-02-06 ENCOUNTER — OFFICE VISIT (OUTPATIENT)
Dept: PSYCHIATRY | Facility: CLINIC | Age: 55
End: 2025-02-06
Payer: COMMERCIAL

## 2025-02-06 DIAGNOSIS — F33.0 MDD (MAJOR DEPRESSIVE DISORDER), RECURRENT EPISODE, MILD: Primary | ICD-10-CM

## 2025-02-06 DIAGNOSIS — F41.1 GAD (GENERALIZED ANXIETY DISORDER): ICD-10-CM

## 2025-02-06 PROCEDURE — 90791 PSYCH DIAGNOSTIC EVALUATION: CPT | Mod: 95,,, | Performed by: PSYCHOLOGIST
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
## 2025-02-07 RX ORDER — PAROXETINE HYDROCHLORIDE 20 MG/1
20 TABLET, FILM COATED ORAL EVERY MORNING
Qty: 30 TABLET | Refills: 11 | Status: SHIPPED | OUTPATIENT
Start: 2025-02-07 | End: 2026-02-07

## 2025-02-07 NOTE — LETTER
February 7, 2025      [facility]  [address] [facility]  [place] [address]  Phone: [phone]  Fax: [phone]       Patient: Medina Ryan   YOB: 1970  Date of Visit: 02/07/2025    To Whom It May Concern:    King Ryan  was at [facility] on 02/07/2025. The patient may return to work/school on 2/10/2025 with no restrictions. Please excuse 2/5 - 2/7.  If you have any questions or concerns, or if I can be of further assistance, please do not hesitate to contact me.    Sincerely,        Taylor Peters MD

## 2025-02-10 NOTE — LETTER
February 10, 2025      [facility]  [address] [facility]  [place] [address]  Phone: [phone]  Fax: [phone]       Patient: Medina Ryan   YOB: 1970  Date of Visit: 02/10/2025    To Whom It May Concern:    King Ryan  was at [facility] on 02/10/2025. The patient may return to work/school on 2/14/2025 with no restrictions. Please excuse dates 2/11/2025 - 2/13/2025. If you have any questions or concerns, or if I can be of further assistance, please do not hesitate to contact me.    Sincerely,        Taylor Peters MD

## 2025-02-10 NOTE — PROGRESS NOTES
Patient ID: Medina Ryan is a 54 y.o. female.    Chief Complaint: General Illness (Entered automatically based on patient selection in [employer].)    The patient initiated a request through [employer] on 1/27/2025 for evaluation and management with a chief complaint of General Illness (Entered automatically based on patient selection in [employer].)     I evaluated the questionnaire submission on 1/27/2025.    [facility] Peq Evisit Supergroup-Chronic Conditions    2/6/2025 12:53 PM CST - Filed by Patient   What do you need help with? Other Concern   Do you agree to participate in an E-Visit? Yes   If you have any of the following symptoms, please present to your local emergency room or call 911:  I acknowledge   Do you have any of the following pregnancy-related conditions? None   What is the main issue you would like addressed today? Anxiety   Please describe your symptoms Feeling burnt out and anxious   Where is your problem located? Not sure exactly   How severe are your symptoms? Moderate   Have you had these symptoms before? Yes   How long have you been having these symptoms? For more than a month   Please list any medications or treatments you have used for your condition and indicate if it was effective or not. Xanax, & Venlafaine 150 mg   What makes this feel better? Once I get off of work and be able to do what or spending time with kids and grandchildren   What makes this feel worse? Alot going in my family little brothers sick and my aunt.  Loss my Mom to cancer in 2018 help take care of her and had to see her leave in a body bag buy the s was devastating.   Are these symptoms related to a condition that you currently have? Yes   What is the condition? Severe Anxiety/ Also having a insect bite on my face turned into a 1year and a half of seeing the dermatologist really discouraged   When were you last seen for this condition?    Please describe any probable cause for these symptoms I feel like I need a  break.  Im feeling stuck in one place.   Provide any additional information you feel is important.    Please attach any relevant images or files    Are you able to take your vital signs? Yes   Systolic Blood Pressure: 123   Diastolic Blood Pressure: 82   Weight: 176   Height:    Pulse:    Temperature:    Respiration rate:    Pulse Oxygen:          Encounter Diagnosis   Name Primary?    Anxiety and depression Yes        No orders of the defined types were placed in this encounter.     Medications Ordered This Encounter   Medications    paroxetine (PAXIL) 20 MG tablet     Sig: Take 1 tablet (20 mg total) by mouth every morning.     Dispense:  30 tablet     Refill:  11        No follow-ups on file.      E-Visit Time Tracking:    Day 1 Time (in minutes): 3  Day 2 Time (in minutes): 3  Day 4 Time (in minutes): 3    Total Time (in minutes): 9

## 2025-02-13 ENCOUNTER — PATIENT MESSAGE (OUTPATIENT)
Dept: PSYCHIATRY | Facility: CLINIC | Age: 55
End: 2025-02-13
Payer: COMMERCIAL

## 2025-02-13 ENCOUNTER — OFFICE VISIT (OUTPATIENT)
Dept: PSYCHIATRY | Facility: CLINIC | Age: 55
End: 2025-02-13
Payer: COMMERCIAL

## 2025-02-13 DIAGNOSIS — F33.0 MDD (MAJOR DEPRESSIVE DISORDER), RECURRENT EPISODE, MILD: Primary | ICD-10-CM

## 2025-02-13 DIAGNOSIS — F41.1 GAD (GENERALIZED ANXIETY DISORDER): ICD-10-CM

## 2025-02-13 PROCEDURE — 4010F ACE/ARB THERAPY RXD/TAKEN: CPT | Mod: CPTII,95,, | Performed by: PSYCHOLOGIST

## 2025-02-13 PROCEDURE — 90837 PSYTX W PT 60 MINUTES: CPT | Mod: 95,,, | Performed by: PSYCHOLOGIST

## 2025-02-13 NOTE — PROGRESS NOTES
[facility]  Individual Psychotherapy (PhD/LCSW)    2/13/2025    Therapeutic Intervention: Met with patient.  Outpatient - Behavior modifying psychotherapy 60 min - CPT code 58437    The patient location is: Patient's home/ Patient reported that her location at the time of this visit was in the [facility]     Visit type: Virtual visit with synchronous audio and video     Each patient to whom he or she provides medical services by telemedicine is: (1) informed of the relationship between the physician and patient and the respective role of any other health care provider with respect to management of the patient; and (2) notified that he or she may decline to receive medical services by telemedicine and may withdraw from such care at any time.     Chief complaint/reason for encounter: depression and anxiety     Interval history and content of current session: Patient presented casually dressed, alert, and oriented.  Patient reported experiencing depressed mood, diminished interest, insomnia, poor appetite, concentration problems, tearfulness, social isolation, decreased memory, excessive anxiety/worry, restlessness/keyed up, irritability, and muscle tension.  Patient denied current suicidal and homicidal ideations.  Active listening skills were used as patient described feeling that she has loss of motivation and would like to focus on her wellbeing.  Educated patient about therapy/CBT and the [facility] as well as reviewed the STeP agreement/consent with patient.  Patient stated her understanding and agreement.  Explored treatment goals and plans.      [facility], Session 1 (Treatment Initiation)   Session Focus:   Brief check-in   Set agenda   Collect rating scales   MARCI-7 Score: (Patient-Rptd) (P) 8  Interpretation: (Patient-Rptd) (P) Mild Anxiety   PHQ8 Score : (Patient-Rptd) (P) 4  PHQ8 Interpretation: (Patient-Rptd) (P) Minimal or None  Introduction to Therapy and CBT   Treatment Plan/Goals   Review  Values and Aspirations   Address patient concerns   Summarize session   Feedback about session      Action Plan:   Review therapy materials   Intervention practice:    Use Cognitive-Behavioral Model Diagram for 3 situations   Complete the Bridging Sessions worksheet for Session 2   Ways to overcome obstacles: dameon brown      02/13/2025    Overall Treatment Plan: Reduce symptoms of depression and anxiety    Values and Aspirations: _____    Problem List / Patients Goals and Evidence-Based Interventions (include up to 5):    Problem #1: Increased anxiousness  Goal: Reduce Anxiety  Therapy Interventions: Relaxation skills, mindfulness, problem solving  Non-Therapy Strategies: Physical exercise, read books, stop rushing   Problem #2: Depressed mood  Goal: Alleviate depressed mood   Therapy Interventions: Behavioral activation, increase social engagement  Non-Therapy Strategies: write in a journal, self care activities   Problem #3: Difficulty expressing self  Goal: Improve communication skills  Therapy Interventions: Assertive communication, set boundaries  Non-Therapy Strategies: increase social involvement, dating   Problem #4:   Goal: ___   Therapy Interventions: ___   Non-Therapy Strategies: ___    Problem #5:   Goal: ___   Therapy Interventions: ___   Non-Therapy Strategies: ___        Treatment plan:  Target symptoms: depression, anxiety   Why chosen therapy is appropriate versus another modality: relevant to diagnosis  Outcome monitoring methods: self-report  Therapeutic intervention type: behavior modifying psychotherapy    Risk parameters:  Patient reports no suicidal ideation  Patient reports no homicidal ideation  Patient reports no self-injurious behavior  Patient reports no violent behavior    Verbal deficits: None    Patient's response to intervention:  The patient's response to intervention is accepting.    Progress toward goals and other mental status changes:  The patient's progress toward goals  is fair .    Diagnosis:     ICD-10-CM ICD-9-CM   1. MDD (major depressive disorder), recurrent episode, mild  F33.0 296.31   2. MARCI (generalized anxiety disorder)  F41.1 300.02       Plan:  individual psychotherapy, consult psychiatrist for medication evaluation, and medication management by physician     Return to clinic: 1 week    Length of Service (minutes): 60

## 2025-02-17 ENCOUNTER — TELEPHONE (OUTPATIENT)
Dept: PSYCHIATRY | Facility: CLINIC | Age: 55
End: 2025-02-17
Payer: COMMERCIAL

## 2025-02-25 ENCOUNTER — TELEPHONE (OUTPATIENT)
Dept: PSYCHIATRY | Facility: CLINIC | Age: 55
End: 2025-02-25
Payer: COMMERCIAL

## 2025-02-25 ENCOUNTER — OFFICE VISIT (OUTPATIENT)
Dept: PSYCHIATRY | Facility: CLINIC | Age: 55
End: 2025-02-25
Payer: COMMERCIAL

## 2025-02-25 DIAGNOSIS — F33.0 MDD (MAJOR DEPRESSIVE DISORDER), RECURRENT EPISODE, MILD: Primary | ICD-10-CM

## 2025-02-25 DIAGNOSIS — F41.1 GAD (GENERALIZED ANXIETY DISORDER): ICD-10-CM

## 2025-02-25 PROCEDURE — 4010F ACE/ARB THERAPY RXD/TAKEN: CPT | Mod: CPTII,95,, | Performed by: PSYCHOLOGIST

## 2025-02-25 PROCEDURE — 90837 PSYTX W PT 60 MINUTES: CPT | Mod: 95,,, | Performed by: PSYCHOLOGIST

## 2025-02-25 NOTE — PROGRESS NOTES
Individual Psychotherapy (PhD/LCSW)    2/25/2025    Therapeutic Intervention: Met with patient.  Outpatient - Behavior modifying psychotherapy 60 min - CPT code 93941    The patient location is: Patient's home/ Patient reported that her location at the time of this visit was in the [facility]     Visit type: Virtual visit with synchronous audio and video     Each patient to whom he or she provides medical services by telemedicine is: (1) informed of the relationship between the physician and patient and the respective role of any other health care provider with respect to management of the patient; and (2) notified that he or she may decline to receive medical services by telemedicine and may withdraw from such care at any time.     Chief complaint/reason for encounter: depression and anxiety     Interval history and content of current session: Patient presented casually dressed, alert, and oriented.  Patient reported experiencing depressed mood, diminished interest, insomnia, poor appetite, concentration problems, tearfulness, social isolation, decreased memory, excessive anxiety/worry, restlessness/keyed up, irritability, and muscle tension.  Patient denied current suicidal and homicidal ideations.  Explored triggers of patient's anxiety.  Patient described that she worries about issues related to family, personal safety, health, and finances, among other things.  Active listening skills were used as patient described feeling like she is put in the middle of discord between her adult children.  Assisted patient in processing her feelings around her strained relationship with her middle son.  She also discussed recent problems she has experienced in dating.      Treatment plan:  Target symptoms: depression, anxiety   Why chosen therapy is appropriate versus another modality: relevant to diagnosis  Outcome monitoring methods: self-report  Therapeutic intervention type: behavior modifying  psychotherapy    Risk parameters:  Patient reports no suicidal ideation  Patient reports no homicidal ideation  Patient reports no self-injurious behavior  Patient reports no violent behavior    Verbal deficits: None    Patient's response to intervention:  The patient's response to intervention is accepting.    Progress toward goals and other mental status changes:  The patient's progress toward goals is fair .    Diagnosis:     ICD-10-CM ICD-9-CM   1. MDD (major depressive disorder), recurrent episode, mild  F33.0 296.31   2. MARCI (generalized anxiety disorder)  F41.1 300.02       Plan:  individual psychotherapy, consult psychiatrist for medication evaluation, and medication management by physician     Return to clinic: 2 weeks    Length of Service (minutes): 60

## 2025-02-25 NOTE — TELEPHONE ENCOUNTER
The patient called the BEBP line because she was confused about scheduling follow-up with Dr. Chew. She noted that Dr. Chew cancelled her subsequent STeP sessions and told her to schedule into a follow-up slot for more supportive therapy.

## 2025-03-15 ENCOUNTER — E-VISIT (OUTPATIENT)
Dept: FAMILY MEDICINE | Facility: CLINIC | Age: 55
End: 2025-03-15
Payer: COMMERCIAL

## 2025-03-15 DIAGNOSIS — F41.9 ANXIETY AND DEPRESSION: ICD-10-CM

## 2025-03-15 DIAGNOSIS — F32.A ANXIETY AND DEPRESSION: ICD-10-CM

## 2025-03-17 RX ORDER — PAROXETINE 30 MG/1
30 TABLET, FILM COATED ORAL EVERY MORNING
Qty: 30 TABLET | Refills: 11 | Status: SHIPPED | OUTPATIENT
Start: 2025-03-17 | End: 2026-03-17

## 2025-03-17 RX ORDER — BUSPIRONE HYDROCHLORIDE 10 MG/1
10 TABLET ORAL 2 TIMES DAILY
Qty: 60 TABLET | Refills: 11 | Status: SHIPPED | OUTPATIENT
Start: 2025-03-17 | End: 2026-03-17

## 2025-03-17 NOTE — PROGRESS NOTES
Patient ID: Medina Ryan is a 54 y.o. female.    Chief Complaint: General Illness (Entered automatically based on patient selection in [employer].)    The patient initiated a request through [employer] on 3/15/2025 for evaluation and management with a chief complaint of General Illness (Entered automatically based on patient selection in [employer].)     I evaluated the questionnaire submission on 3/17/2025.    [facility] Peq Evisit Supergroup-Medication    3/15/2025 10:46 PM CDT - Filed by Patient   What do you need help with? Medication Management   Do you agree to participate in an E-Visit? Yes   If you have any of the following symptoms, please present to your local emergency room or call 911:  I acknowledge   Medication requests for narcotics will not be addressed via an E-Visit.  Please schedule an appointment. I acknowledge   Do you have any of the following pregnancy-related conditions? None   Do you want to address a new or existing medication? I would like to address a medication I currently take   What is the main issue you would like addressed today? Changing the dose on Paxil   Would you like to change or continue your medication? Continue medication   What medication would you like to continue?  Paxil   Are you taking it as prescribed? Yes    What medical condition is the  medication intended to treat? Anxiety   Is the medication helping your condition? No   Are you having any side effects from the medication? No   Provide any additional information you feel is important. Doesnt feel like its controlling my anxietyxan Dr. Peters increase the dose or place me on another medication   Please attach any relevant images or files    Are you able to take your vital signs? Yes   Systolic Blood Pressure: 123   Diastolic Blood Pressure: 75   Weight: 174   Height:    Pulse:    Temperature:    Respiration rate:    Pulse Oxygen:          Encounter Diagnosis   Name Primary?    Anxiety and depression         No orders of the  defined types were placed in this encounter.     Medications Ordered This Encounter   Medications    busPIRone (BUSPAR) 10 MG tablet     Sig: Take 1 tablet (10 mg total) by mouth 2 (two) times daily.     Dispense:  60 tablet     Refill:  11    paroxetine (PAXIL) 30 MG tablet     Sig: Take 1 tablet (30 mg total) by mouth every morning.     Dispense:  30 tablet     Refill:  11        Follow up if symptoms worsen or fail to improve.      E-Visit Time Tracking:

## 2025-03-20 ENCOUNTER — E-VISIT (OUTPATIENT)
Dept: FAMILY MEDICINE | Facility: CLINIC | Age: 55
End: 2025-03-20
Payer: COMMERCIAL

## 2025-03-20 DIAGNOSIS — F41.9 ANXIETY AND DEPRESSION: Primary | ICD-10-CM

## 2025-03-20 DIAGNOSIS — G47.00 INSOMNIA, UNSPECIFIED TYPE: ICD-10-CM

## 2025-03-20 DIAGNOSIS — F32.A ANXIETY AND DEPRESSION: Primary | ICD-10-CM

## 2025-03-21 NOTE — PROGRESS NOTES
"Patient ID: Medina Ryan is a 54 y.o. female.    Chief Complaint: General Illness (Entered automatically based on patient selection in [employer].)    The patient initiated a request through [employer] on 3/20/2025 for evaluation and management with a chief complaint of General Illness (Entered automatically based on patient selection in [employer].)     I evaluated the questionnaire submission on 3/21/2025.    Ohs Peq Evisit Supergroup-Medication    3/20/2025  1:44 PM CDT - Filed by Patient   What do you need help with? Anxiety   Do you agree to participate in an E-Visit? Yes   If you have any of the following symptoms, please present to your local emergency room or call 911:  I acknowledge   Medication requests for narcotics will not be addressed via an E-Visit.  Please schedule an appointment. I acknowledge   Do you have any of the following pregnancy-related conditions? None   What is the main issue you would like addressed today? Insomnia   Fear of embarrassment causes me to avoid doing things or speaking to people. No   I avoid activities in which I am the center of attention. Yes   Being embarrassed or looking stupid are among my worst fears. No   I would like to address: Other concerns related to Anxiety or Depression   By selecting "I understand," you acknowledge that the answers that you provide for this questionnaire may not be immediately viewed by your provider or your care team. If you are personally dealing with suicidal thoughts or a crisis, please consider contacting your provider's office.  If your provider is not available, please consider taking action by: calling 911 or the National Suicide Prevention Lifeline any day, any time at 1-425.560.9962 or texting SIGNS to 796380 for 24/7 anonymous, free crisis counseling. I understand   Over the last 2 weeks, how often have you been bothered by the following problems?   Little interest or pleasure in doing things Not at all   Feeling down, depressed, " or hopeless Not at all   PHQ-2 Score (range: 0 - 6) 0 (Further screening not recommended)   Feeling nervous, anxious, on edge Several days   Not being able to stop or control worrying Several days   Worrying too much about different things Several days   Trouble relaxing Nearly everyday   Being so restless that its hard to sit still Several days   Becoming easily annoyed or irritable Not at all   Feeling afraid as if something awful might happen Not at all   If you marked you are experiencing any of the aforementioned problems, how difficult have these made it for you to do your work, take care of things at home, or get along with other people? Not difficult at all   TOTAL SCORE: (range: 0 - 21) 7   Please describe your symptoms. 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 (range: 1 - 10) 7   Have you had these symptoms before? Yes   How long have you had these symptoms? About a month   What helps with your symptoms? Cant rest or relax   What makes your symptoms feel worse? Insomnia   Are your symptoms related to a condition you currently have? No   Please describe any probable cause for your symptoms. Not sure   Provide any additional information you feel is important. Ive been have a rash on my face for the last year that wouldnt go awat.  Just found out by Dr. Bahena its Discord Lupus.   Which Im relieved to find out .   Please attach any relevant images or files    Are you able to take your vital signs? Yes   Systolic Blood Pressure: 123   Diastolic Blood Pressure: 70   Weight:    Height:    Pulse:    Temperature:    Respiration rate:    Pulse Oxygen:          Encounter Diagnoses   Name Primary?    Anxiety and depression Yes    Insomnia, unspecified type         No orders of the defined types were placed in this encounter.     Medications Ordered This Encounter   Medications    amitriptyline  (ELAVIL) 25 MG tablet     Sig: Take 1 tablet (25 mg total) by mouth nightly as needed for Insomnia.     Dispense:  30 tablet     Refill:  2        Follow up if symptoms worsen or fail to improve.      E-Visit Time Tracking:    Day 1 Time (in minutes): 6    Total Time (in minutes): 6

## 2025-03-24 RX ORDER — AMITRIPTYLINE HYDROCHLORIDE 25 MG/1
25 TABLET, FILM COATED ORAL NIGHTLY PRN
Qty: 30 TABLET | Refills: 2 | Status: SHIPPED | OUTPATIENT
Start: 2025-03-24 | End: 2026-03-24

## 2025-04-28 ENCOUNTER — E-VISIT (OUTPATIENT)
Dept: FAMILY MEDICINE | Facility: CLINIC | Age: 55
End: 2025-04-28
Payer: COMMERCIAL

## 2025-04-28 DIAGNOSIS — R74.8 ELEVATED LIVER ENZYMES: Primary | ICD-10-CM

## 2025-04-28 DIAGNOSIS — L93.0 DISCOID LUPUS: ICD-10-CM

## 2025-04-28 NOTE — PROGRESS NOTES
Patient ID: Medina Ryan is a 54 y.o. female.    Chief Complaint: General Illness (Entered automatically based on patient selection in [employer] [facility].)    The patient initiated a request through [employer] on 4/28/2025 for evaluation and management with a chief complaint of General Illness (Entered automatically based on patient selection in [employer].)     I evaluated the questionnaire submission on 4/28/2025.    Ohs Peq Evisit Supergroup-Chronic Conditions    4/28/2025 10:43 AM CDT - Filed by Patient   What do you need help with? Other Concern   Do you agree to participate in an E-Visit? Yes   If you have any of the following symptoms, please go to the nearest emergency room or call 911: I acknowledge   Do you have any of the following pregnancy-related conditions? None   What is the main issue you would like addressed today? Discord Lupus   Please describe your symptoms. Face butterfly rash, swelling of my left knee fatigue   Where is your problem located? In my body   On a scale of 1-10, where 10 is the worst you can imagine, how severe are your symptoms? (range: 1 - 10) 8   Have you had these symptoms before? Yes   How long have you had these symptoms? More than a month   What helps with your symptoms? Not sure it appears that Hydroxchloroquine 200 /Im off now   What makes your symptoms feel worse? Pj Lupus/ Dr Bahena dermatologist told me to contact my Primary doctor   Are your symptoms related to a condition you currently have? Yes   What condition do you currently have? Discord Lupus/ I guess its starting to wear me out with this uncertainty   When were you last seen for this condition? 4/24/2025   Provide any additional information you feel is important. Carrie yanez having a rash on face for the last year or so went to two different dermatology centers.  They now dont really want deal with need some sort of plan from here.   Please attach any relevant images or files    Are you able to take your vital  signs? No         Encounter Diagnoses   Name Primary?    Elevated liver enzymes Yes    Discoid lupus         Orders Placed This Encounter   Procedures    Ambulatory referral/consult to Hepatology     Standing Status:   Future     Expected Date:   5/6/2025     Expiration Date:   5/29/2026     Referral Priority:   Routine     Referral Type:   Consultation     Referral Reason:   Specialty Services Required     Number of Visits Requested:   1    Ambulatory referral/consult to Rheumatology     Standing Status:   Future     Expected Date:   5/6/2025     Expiration Date:   5/29/2026     Referral Priority:   Routine     Referral Type:   Consultation     Referral Reason:   Specialty Services Required     Requested Specialty:   Rheumatology     Number of Visits Requested:   1            No follow-ups on file.      E-Visit Time Tracking:    Day 1 Time (in minutes): 9    Total Time (in minutes): 9

## 2025-06-09 ENCOUNTER — PATIENT MESSAGE (OUTPATIENT)
Dept: ADMINISTRATIVE | Facility: HOSPITAL | Age: 55
End: 2025-06-09
Payer: COMMERCIAL

## 2025-06-09 ENCOUNTER — PATIENT OUTREACH (OUTPATIENT)
Dept: ADMINISTRATIVE | Facility: HOSPITAL | Age: 55
End: 2025-06-09
Payer: COMMERCIAL

## 2025-06-09 VITALS — SYSTOLIC BLOOD PRESSURE: 125 MMHG | DIASTOLIC BLOOD PRESSURE: 78 MMHG

## 2025-06-15 DIAGNOSIS — F41.9 ANXIETY AND DEPRESSION: ICD-10-CM

## 2025-06-15 DIAGNOSIS — G47.00 INSOMNIA, UNSPECIFIED TYPE: ICD-10-CM

## 2025-06-15 DIAGNOSIS — F32.A ANXIETY AND DEPRESSION: ICD-10-CM

## 2025-06-15 NOTE — TELEPHONE ENCOUNTER
Refill Routing Note   Medication(s) are not appropriate for processing by [facility] for the following reason(s):        New or recently adjusted medication    [facility] action(s):  Defer             Appointments  past 12m or future 3m with PCP    Date Provider   Last Visit   4/28/2025 Taylor Peters MD   Next Visit   7/11/2025 Taylor Peters MD   ED visits in past 90 days: 0        Note composed:4:50 PM 06/15/2025

## 2025-06-15 NOTE — TELEPHONE ENCOUNTER
No care due was identified.  [facility] Embedded Care Due Messages. Reference number: [id number].   6/15/2025 7:10:04 AM CDT

## 2025-06-16 RX ORDER — AMITRIPTYLINE HYDROCHLORIDE 25 MG/1
25 TABLET, FILM COATED ORAL NIGHTLY
Qty: 30 TABLET | Refills: 2 | Status: SHIPPED | OUTPATIENT
Start: 2025-06-16

## 2025-06-30 ENCOUNTER — LAB VISIT (OUTPATIENT)
Dept: LAB | Facility: HOSPITAL | Age: 55
End: 2025-06-30
Attending: FAMILY MEDICINE
Payer: COMMERCIAL

## 2025-06-30 DIAGNOSIS — I10 HYPERTENSION, UNCONTROLLED: ICD-10-CM

## 2025-06-30 DIAGNOSIS — E78.5 HYPERLIPIDEMIA, UNSPECIFIED HYPERLIPIDEMIA TYPE: ICD-10-CM

## 2025-06-30 LAB
ABSOLUTE EOSINOPHIL (OHS): 0.03 K/UL
ABSOLUTE MONOCYTE (OHS): 0.4 K/UL (ref 0.3–1)
ABSOLUTE NEUTROPHIL COUNT (OHS): 1.99 K/UL (ref 1.8–7.7)
ALBUMIN SERPL BCP-MCNC: 4.3 G/DL (ref 3.5–5.2)
ALP SERPL-CCNC: 53 UNIT/L (ref 40–150)
ALT SERPL W/O P-5'-P-CCNC: 56 UNIT/L (ref 10–44)
ANION GAP (OHS): 14 MMOL/L (ref 8–16)
AST SERPL-CCNC: 33 UNIT/L (ref 11–45)
BASOPHILS # BLD AUTO: 0.03 K/UL
BASOPHILS NFR BLD AUTO: 0.8 %
BILIRUB SERPL-MCNC: 0.7 MG/DL (ref 0.1–1)
BUN SERPL-MCNC: 10 MG/DL (ref 6–20)
CALCIUM SERPL-MCNC: 9.6 MG/DL (ref 8.7–10.5)
CHLORIDE SERPL-SCNC: 106 MMOL/L (ref 95–110)
CHOLEST SERPL-MCNC: 254 MG/DL (ref 120–199)
CHOLEST/HDLC SERPL: 3.1 {RATIO} (ref 2–5)
CO2 SERPL-SCNC: 22 MMOL/L (ref 23–29)
CREAT SERPL-MCNC: 1.2 MG/DL (ref 0.5–1.4)
EAG (OHS): 111 MG/DL (ref 68–131)
ERYTHROCYTE [DISTWIDTH] IN BLOOD BY AUTOMATED COUNT: 12.6 % (ref 11.5–14.5)
GFR SERPLBLD CREATININE-BSD FMLA CKD-EPI: 54 ML/MIN/1.73/M2
GLUCOSE SERPL-MCNC: 123 MG/DL (ref 70–110)
HBA1C MFR BLD: 5.5 % (ref 4–5.6)
HCT VFR BLD AUTO: 39 % (ref 37–48.5)
HDLC SERPL-MCNC: 81 MG/DL (ref 40–75)
HDLC SERPL: 31.9 % (ref 20–50)
HGB BLD-MCNC: 12 GM/DL (ref 12–16)
IMM GRANULOCYTES # BLD AUTO: 0.01 K/UL (ref 0–0.04)
IMM GRANULOCYTES NFR BLD AUTO: 0.3 % (ref 0–0.5)
LDLC SERPL CALC-MCNC: 151 MG/DL (ref 63–159)
LYMPHOCYTES # BLD AUTO: 1.4 K/UL (ref 1–4.8)
MCH RBC QN AUTO: 27.4 PG (ref 27–31)
MCHC RBC AUTO-ENTMCNC: 30.8 G/DL (ref 32–36)
MCV RBC AUTO: 89 FL (ref 82–98)
NONHDLC SERPL-MCNC: 173 MG/DL
NUCLEATED RBC (/100WBC) (OHS): 0 /100 WBC
PLATELET # BLD AUTO: 443 K/UL (ref 150–450)
PMV BLD AUTO: 10.1 FL (ref 9.2–12.9)
POTASSIUM SERPL-SCNC: 3.8 MMOL/L (ref 3.5–5.1)
PROT SERPL-MCNC: 8 GM/DL (ref 6–8.4)
RBC # BLD AUTO: 4.38 M/UL (ref 4–5.4)
RELATIVE EOSINOPHIL (OHS): 0.8 %
RELATIVE LYMPHOCYTE (OHS): 36.3 % (ref 18–48)
RELATIVE MONOCYTE (OHS): 10.4 % (ref 4–15)
RELATIVE NEUTROPHIL (OHS): 51.4 % (ref 38–73)
SODIUM SERPL-SCNC: 142 MMOL/L (ref 136–145)
TRIGL SERPL-MCNC: 110 MG/DL (ref 30–150)
TSH SERPL-ACNC: 1.44 UIU/ML (ref 0.4–4)
WBC # BLD AUTO: 3.86 K/UL (ref 3.9–12.7)

## 2025-06-30 PROCEDURE — 82040 ASSAY OF SERUM ALBUMIN: CPT

## 2025-06-30 PROCEDURE — 83036 HEMOGLOBIN GLYCOSYLATED A1C: CPT

## 2025-06-30 PROCEDURE — 85025 COMPLETE CBC W/AUTO DIFF WBC: CPT

## 2025-06-30 PROCEDURE — 84443 ASSAY THYROID STIM HORMONE: CPT

## 2025-06-30 PROCEDURE — 36415 COLL VENOUS BLD VENIPUNCTURE: CPT | Mod: PO

## 2025-06-30 PROCEDURE — 82465 ASSAY BLD/SERUM CHOLESTEROL: CPT

## 2025-07-05 ENCOUNTER — E-VISIT (OUTPATIENT)
Dept: FAMILY MEDICINE | Facility: CLINIC | Age: 55
End: 2025-07-05
Payer: COMMERCIAL

## 2025-07-05 DIAGNOSIS — F32.A ANXIETY AND DEPRESSION: Primary | ICD-10-CM

## 2025-07-05 DIAGNOSIS — F41.9 ANXIETY AND DEPRESSION: Primary | ICD-10-CM

## 2025-07-06 RX ORDER — VENLAFAXINE HYDROCHLORIDE 75 MG/1
75 CAPSULE, EXTENDED RELEASE ORAL DAILY
Qty: 30 CAPSULE | Refills: 11 | Status: SHIPPED | OUTPATIENT
Start: 2025-07-06 | End: 2026-07-06

## 2025-07-06 NOTE — PROGRESS NOTES
Patient ID: Medina Ryan is a 54 y.o. female.    Chief Complaint: General Illness (Entered automatically based on patient selection in [facility].)    The patient initiated a request through [employer] on 7/5/2025 for evaluation and management with a chief complaint of General Illness (Entered automatically based on patient selection in [employer].)     I evaluated the questionnaire submission on 7/6/2025.    Ohs Peq Evisit Supergroup-Medication    7/5/2025  7:43 PM CDT - Filed by Patient   What do you need help with? Medication Request   Do you agree to participate in an E-Visit? Yes   If you have any of the following symptoms, please present to your local emergency room or call 911:  I acknowledge   Medication requests for narcotics will not be addressed via an E-Visit.  Please schedule an appointment. I acknowledge   Do you have any of the following pregnancy-related conditions? (Pregnant, Possibly pregnant, Breast feeding, None) None   Do you want to address a new or existing medication? (Start a new medication, Address a current medication) Start a new medication   What is the main issue you would like addressed today? Changing Paxil   What is the name of the medication you would like to start? Wellbutrin   Have you taken a similar medication in the past? Yes   What is the name of the similar medication you used in the past? Paxil   Why do you no longer use the similar medication? (Cost or insurance issues, Medication no longer available, Side effects, Medication not effective, None) Medication not effective   What effect has your medication had on your problem? (None, Less than expected) Less than expected    What medical condition is the  medication intended to treat? Depression & Anxiety   Provide any information you feel is important to your history not asked above The reason Im been dealing with high stress with work and my health.  Also my eldest Aunt takes Paxil for 20 years and all of her hair fell out  and shes on a high dosage.   Please attach any relevant images or files    Are you able to take your vitals? Yes   Systolic Blood Pressure 120   Diastolic Blood Pressure 78   Weight: 185   Height:    Pluse:    Temp    Resp:    SpO2          Encounter Diagnosis   Name Primary?    Anxiety and depression Yes        No orders of the defined types were placed in this encounter.     Medications Ordered This Encounter   Medications    venlafaxine (EFFEXOR-XR) 75 MG 24 hr capsule     Sig: Take 1 capsule (75 mg total) by mouth once daily.     Dispense:  30 capsule     Refill:  11        No follow-ups on file.      E-Visit Time Tracking:    Day 1 Time (in minutes): 5    Total Time (in minutes): 5

## 2025-07-11 DIAGNOSIS — I10 HYPERTENSION, UNCONTROLLED: ICD-10-CM

## 2025-07-11 RX ORDER — OLMESARTAN MEDOXOMIL 20 MG/1
20 TABLET ORAL
Qty: 90 TABLET | Refills: 1 | Status: SHIPPED | OUTPATIENT
Start: 2025-07-11

## 2025-07-11 NOTE — TELEPHONE ENCOUNTER
Refill Decision Note   Medina Ryan  is requesting a refill authorization.  Brief Assessment and Rationale for Refill:  Approve     Medication Therapy Plan:        Comments:     Note composed:6:54 PM 07/11/2025

## 2025-07-11 NOTE — TELEPHONE ENCOUNTER
No care due was identified.  [facility] Embedded Care Due Messages. Reference number: [id number].   7/11/2025 10:28:12 AM CDT

## 2025-07-14 ENCOUNTER — OFFICE VISIT (OUTPATIENT)
Dept: FAMILY MEDICINE | Facility: CLINIC | Age: 55
End: 2025-07-14
Payer: COMMERCIAL

## 2025-07-14 VITALS
WEIGHT: 193.56 LBS | SYSTOLIC BLOOD PRESSURE: 156 MMHG | HEART RATE: 97 BPM | BODY MASS INDEX: 28.67 KG/M2 | OXYGEN SATURATION: 100 % | DIASTOLIC BLOOD PRESSURE: 120 MMHG | HEIGHT: 69 IN

## 2025-07-14 DIAGNOSIS — F32.A ANXIETY AND DEPRESSION: ICD-10-CM

## 2025-07-14 DIAGNOSIS — R74.8 ELEVATED LIVER ENZYMES: ICD-10-CM

## 2025-07-14 DIAGNOSIS — N95.1 MENOPAUSAL SYMPTOMS: ICD-10-CM

## 2025-07-14 DIAGNOSIS — L93.0 DISCOID LUPUS: ICD-10-CM

## 2025-07-14 DIAGNOSIS — F41.9 ANXIETY AND DEPRESSION: ICD-10-CM

## 2025-07-14 DIAGNOSIS — E78.5 HYPERLIPIDEMIA, UNSPECIFIED HYPERLIPIDEMIA TYPE: ICD-10-CM

## 2025-07-14 DIAGNOSIS — I10 HYPERTENSION, UNCONTROLLED: Primary | ICD-10-CM

## 2025-07-14 PROCEDURE — 99999 PR PBB SHADOW E&M-EST. PATIENT-LVL IV: CPT | Mod: PBBFAC,,, | Performed by: FAMILY MEDICINE

## 2025-07-14 RX ORDER — ATORVASTATIN CALCIUM 10 MG/1
10 TABLET, FILM COATED ORAL DAILY
Qty: 90 TABLET | Refills: 3 | Status: SHIPPED | OUTPATIENT
Start: 2025-07-14 | End: 2026-07-14

## 2025-07-14 RX ORDER — ESTRADIOL 0.05 MG/D
1 FILM, EXTENDED RELEASE TRANSDERMAL
COMMUNITY

## 2025-07-14 RX ORDER — TRIAMCINOLONE ACETONIDE 1 MG/G
OINTMENT TOPICAL 2 TIMES DAILY
COMMUNITY
Start: 2025-03-18

## 2025-07-14 RX ORDER — HYDROXYCHLOROQUINE SULFATE 200 MG/1
200 TABLET, FILM COATED ORAL
COMMUNITY
Start: 2025-05-12

## 2025-07-14 RX ORDER — FLUOCINONIDE 0.05 MG/G
OINTMENT TOPICAL 2 TIMES DAILY
COMMUNITY
Start: 2025-06-18

## 2025-07-14 RX ORDER — KETOCONAZOLE 20 MG/ML
SHAMPOO, SUSPENSION TOPICAL EVERY OTHER DAY
COMMUNITY
Start: 2025-03-18

## 2025-07-14 RX ORDER — CLINDAMYCIN PHOSPHATE 10 MG/G
GEL TOPICAL 2 TIMES DAILY
COMMUNITY
Start: 2025-06-30

## 2025-07-14 RX ORDER — ALPRAZOLAM 1 MG/1
1 TABLET ORAL EVERY 12 HOURS PRN
COMMUNITY
Start: 2025-06-24

## 2025-07-14 RX ORDER — PROGESTERONE 100 MG/1
100 CAPSULE ORAL NIGHTLY
COMMUNITY

## 2025-07-14 RX ORDER — OLMESARTAN MEDOXOMIL 40 MG/1
40 TABLET ORAL DAILY
Qty: 90 TABLET | Refills: 3 | Status: SHIPPED | OUTPATIENT
Start: 2025-07-14

## 2025-07-14 RX ORDER — DESONIDE 0.5 MG/G
OINTMENT TOPICAL
COMMUNITY
Start: 2025-07-10

## 2025-07-14 NOTE — PROGRESS NOTES
Routine Office Visit     Patient Name: Medina Ryan    : 1970  MRN: [id number]    Subjective     History of Present Illness    CHIEF COMPLAINT:  Annual physical   Discoid lupus   MDD   Hypertension   Hyperlipidemia   Menopausal symptoms     LUPUS:  She reports ongoing management of lupus with Dr. Ayad Thompson in [place] for skin manifestations. She was previously treated with Hydroxychloroquine but is currently not on any medication. She experiences significant fatigue with alternating days of high energy followed by extreme exhaustion requiring extended rest. She has required intermittent FMLA due to symptoms and medical appointments. She reports pain, particularly after periods of activity such as hosting family during a holiday. Family history is significant for lupus with a cousin diagnosed with SLE. She notes potential long-standing undiagnosed condition, potentially present since early adulthood but only recently manifesting. She is currently undergoing dermatological evaluation and monitoring, with recent improvements in skin condition noted by her specialist.    HYPERTENSION:  She reports ongoing management of hypertension. She is currently taking Olmesartan with dosage recently increased to 40 mg. She was previously on Spironolactone but this medication was discontinued. She acknowledges experiencing white coat hypertension and reports blood pressure tends to be more stable when measured at home compared to clinical settings.    LABS:  Cholesterol panel shows total cholesterol increased by 10 points with elevated and increasing LDL. HDL remains high and triglycerides are within normal limits. Blood sugar levels are borderline, approaching pre-diabetes range. Thyroid function is normal with no evidence of anemia.    CURRENT MEDICATIONS:  She is currently on Hormone Replacement Therapy with progesterone and transdermal patch. She expresses uncertainty about appropriateness of current  "HRT regimen.    WEIGHT MANAGEMENT:  She acknowledges recent weight gain attributed to sedentary behavior and poor dietary choices. She reports decreased physical activity and consuming unrestricted foods and beverages, including cold drinks. She recognizes personal responsibility for weight gain, noting her current lifestyle is atypical for her.      ROS:  General: no fever, no chills, +fatigue, +weight gain, no weight loss  Eyes: no vision changes, no redness, no discharge  ENT: no ear pain, no nasal congestion, no sore throat  Cardiovascular: no chest pain, no palpitations, no lower extremity edema  Respiratory: no cough, no shortness of breath  Gastrointestinal: no abdominal pain, no nausea, no vomiting, no diarrhea, no constipation, no blood in stool  Genitourinary: no dysuria, no hematuria, no frequency  Musculoskeletal: no joint pain, no muscle pain  Skin: +rash, no lesion, +slow wound healing  Neurological: no headache, no dizziness, no numbness, no tingling  Psychiatric: no anxiety, no depression, no sleep difficulty           Objective     BP (!) 156/120 (BP Location: Left arm, Patient Position: Sitting)   Pulse 97   Ht 5' 9" (1.753 m)   Wt 87.8 kg (193 lb 9 oz)   SpO2 100%   BMI 28.58 kg/m²   Physical Exam  Constitutional:       Appearance: She is well-developed.   HENT:      Head: Normocephalic and atraumatic.   Eyes:      Conjunctiva/sclera: Conjunctivae normal.      Pupils: Pupils are equal, round, and reactive to light.   Cardiovascular:      Rate and Rhythm: Normal rate and regular rhythm.      Heart sounds: Normal heart sounds. No murmur heard.     No friction rub. No gallop.   Pulmonary:      Effort: No respiratory distress.      Breath sounds: Normal breath sounds.   Abdominal:      General: Bowel sounds are normal. There is no distension.      Palpations: Abdomen is soft.      Tenderness: There is no abdominal tenderness.   Musculoskeletal:         General: Normal range of motion.      " Cervical back: Normal range of motion and neck supple.   Lymphadenopathy:      Cervical: No cervical adenopathy.   Skin:     General: Skin is warm.   Neurological:      Mental Status: She is alert and oriented to person, place, and time.           Assessment     Assessment & Plan        Annual physical exam    Patient to research and implement an anti-inflammatory or autoimmune diet, focusing on fruits, vegetables, whole foods, and cooked meats while reducing inflammatory foods, particularly sodas and sweeteners.   Advised on importance of consistent mammogram screening at same location annually.   Reviewed recent lab results, including liver enzymes, renal function, cholesterol levels, and glucose.   Evaluated thyroid function, which is within normal limits.      FOLLOW-UP:   Follow up in 2 weeks for BP check and in 6 months for comprehensive evaluation.         Problem List Items Addressed This Visit          Psychiatric    Anxiety and depression  The current medical regimen is effective;  continue present plan and medications.          Cardiac/Vascular    Hyperlipidemia    Relevant Orders    Comprehensive Metabolic Panel    Lipid Panel   Calculated cardiovascular risk score and recommended initiation of cholesterol medication due to high levels and risk factors.   Initiated low dose of cholesterol medication and ordered lipid panel for next visit.    The 10-year ASCVD risk score (Jace MILLARD, et al., 2019) is: 7.7%    Values used to calculate the score:      Age: 54 years      Sex: Female      Is Non- : Yes      Diabetic: No      Tobacco smoker: No      Systolic Blood Pressure: 156 mmHg      Is BP treated: Yes      HDL Cholesterol: 81 mg/dL      Total Cholesterol: 254 mg/dL        Hypertension, uncontrolled - Primary    Relevant Medications    olmesartan (BENICAR) 40 MG tablet    Other Relevant Orders    Comprehensive Metabolic Panel    Lipid Panel   Monitored high blood pressure while  patient is currently taking olmesartan.   Electrolyte, sodium, and potassium levels are good, indicating stable condition.   Increased olmesartan to 40 mg daily to better manage hypertension.   Instructed patient to monitor BP at home, report readings, and schedule blood pressure check in 2 weeks.   Patient to contact office with BP results after starting new medication.       Other Visit Diagnoses         Discoid lupus       Patient was previously on Hydroxychloroquine for skin condition but is not currently on any medication.   According to dermatologist, skin condition is healing better.   Explained potential link between diuretics and lupus rash exacerbation.   Discussed benefits of an anti-inflammatory or autoimmune diet for managing symptoms.   Referred patient to rheumatologist for further lupus management.            Menopausal symptoms       Patient is on hormone replacement therapy, taking progesterone and using a patch.   Therapy is helping with hot flashes.        Elevated liver enzymes       Liver enzymes have shown improvement and kidney function has improved from last visit but remains decreased.   Possibly indicating fatty liver disease.   Recommend weight loss.   Scheduled hepatology appointment based on lab results.   Ordered liver panel and scheduled liver and cholesterol panel in 6 months.   Planned to monitor condition with ultrasound and follow-up visits.          This note was generated with the assistance of ambient listening technology. Verbal consent was obtained by the patient and accompanying visitor(s) for the recording of patient appointment to facilitate this note. I attest to having reviewed and edited the generated note for accuracy, though some syntax or spelling errors may persist. Please contact the author of this note for any clarification.

## 2025-07-18 ENCOUNTER — OFFICE VISIT (OUTPATIENT)
Dept: FAMILY MEDICINE | Facility: CLINIC | Age: 55
End: 2025-07-18
Payer: COMMERCIAL

## 2025-07-18 DIAGNOSIS — L93.0 DISCOID LUPUS: ICD-10-CM

## 2025-07-18 DIAGNOSIS — F32.A ANXIETY AND DEPRESSION: ICD-10-CM

## 2025-07-18 DIAGNOSIS — I10 HYPERTENSION, UNCONTROLLED: Primary | ICD-10-CM

## 2025-07-18 DIAGNOSIS — F41.9 ANXIETY AND DEPRESSION: ICD-10-CM

## 2025-07-18 DIAGNOSIS — E78.49 OTHER HYPERLIPIDEMIA: ICD-10-CM

## 2025-07-18 DIAGNOSIS — R74.8 ELEVATED LIVER ENZYMES: ICD-10-CM

## 2025-07-18 DIAGNOSIS — Z02.89 ENCOUNTER FOR COMPLETION OF FORM WITH PATIENT: ICD-10-CM

## 2025-07-18 NOTE — PROGRESS NOTES
Telemedicine Office Visit    Medina Ryan    1970  [id number]    Subjective     The patient location is: home   The chief complaint leading to consultation is: FMLA paperwork   Visit type: Virtual visit with synchronous audio and video  Total time spent with patient: 20 minutes   Each patient to whom he or she provides medical services by telemedicine is:  (1) informed of the relationship between the physician and patient and the respective role of any other health care provider with respect to management of the patient; and (2) notified that he or she may decline to receive medical services by telemedicine and may withdraw from such care at any time.    History of Present Illness    CHIEF COMPLAINT:  Patient presents today for FMLA paperwork completion.    HISTORY OF PRESENT ILLNESS:  She reports feeling weak and fatigued recently and experienced an episode last week where she was not feeling well. She emphasizes that she does not typically experience frequent illness and generally continues working even when not feeling optimal. She is currently monitoring the effectiveness of a new medication and its impact on her overall health status while continuing to manage her condition and work from home.    LUPUS:  She reports ongoing management of Lupus with upcoming follow-up visits scheduled with rheumatologist and nephrologist next month for chronic condition monitoring.    ELEVATED LIVER ENZYMES:  She reports awareness of elevated liver enzymes.    MEDICATIONS:  She recently started a new medication and is currently monitoring its effectiveness.    MENTAL HEALTH TREATMENT:  She reports previous therapy experience with a provider she did not find satisfactory. She plans to return to psychiatric care with a new psychiatrist and will be seeing the psychiatrist twice weekly virtually during her work-from-home hours. She is motivated to continue mental health treatment and explore a more suitable therapeutic  relationship.    UPCOMING MEDICAL APPOINTMENTS:  She has scheduled appointments including rheumatologist on 8/14/2025, nephrologist next month, hepatologist for liver evaluation, dermatology on 8/14/2025, and primary care physician on 1/15/2026. She anticipates potential work accommodations including ability to work half-days during medical appointments over the next six months, with estimated two episodes per month lasting four hours each.      ROS:  General: no fever, no chills, +fatigue, no weight gain, no weight loss, +weakness  Eyes: no vision changes, no redness, no discharge  ENT: no ear pain, no nasal congestion, no sore throat  Cardiovascular: no chest pain, no palpitations, no lower extremity edema  Respiratory: no cough, no shortness of breath  Gastrointestinal: no abdominal pain, no nausea, no vomiting, no diarrhea, no constipation, no blood in stool  Genitourinary: no dysuria, no hematuria, no frequency  Musculoskeletal: no joint pain, no muscle pain  Skin: no rash, no lesion  Neurological: no headache, no dizziness, no numbness, no tingling  Psychiatric: no anxiety, no depression, no sleep difficulty         Answers submitted by the patient for this visit:  Review of Systems Questionnaire (Submitted on 7/16/2025)  activity change: No  unexpected weight change: No  neck pain: No  hearing loss: No  rhinorrhea: No  trouble swallowing: No  eye discharge: No  visual disturbance: No  chest tightness: No  wheezing: No  chest pain: No  palpitations: No  blood in stool: No  constipation: No  vomiting: No  diarrhea: No  polydipsia: No  polyuria: No  difficulty urinating: No  hematuria: No  menstrual problem: No  dysuria: No  joint swelling: No  arthralgias: No  headaches: No  weakness: No  confusion: No  dysphoric mood: No    There were no vitals taken for this visit.  Physical Exam  Constitutional:       Appearance: She is well-developed.   HENT:      Head: Normocephalic and atraumatic.      Right Ear: External  ear normal.      Left Ear: External ear normal.   Eyes:      Conjunctiva/sclera: Conjunctivae normal.   Pulmonary:      Effort: Pulmonary effort is normal.   Musculoskeletal:      Cervical back: Normal range of motion.   Skin:     Coloration: Skin is not pale.   Neurological:      Mental Status: She is alert and oriented to person, place, and time.   Psychiatric:         Behavior: Behavior normal.         Thought Content: Thought content normal.         Judgment: Judgment normal.             Plan     Assessment & Plan        FOLLOW-UP:   Advised the patient can  a copy of paperwork later if needed.   Follow up on 1/15/2026 as scheduled.           Problem List Items Addressed This Visit          Cardiac/Vascular    Hyperlipidemia    Hypertension, uncontrolled - Primary  The current medical regimen is effective;  continue present plan and medications.        Other Visit Diagnoses         Anxiety and depression    Patient is planning to see a psychiatrist twice weekly for ongoing monitoring of mental health.   Advised the patient to obtain documentation from the psychiatrist for therapy sessions.   Acknowledged ongoing psychiatric care.    Discoid lupus       Completed [facility] paperwork for chronic conditions, including lupus, to be faxed to [phone], attention: [place].   Determined need for intermittent leave for medical appointments and potential episodes of incapacity.   Patient is planning to see a rheumatologist next month for lupus management and is considering therapy while evaluating the effectiveness of new medication.   Recommend ongoing care with rheumatologist, nephrologist, and hepatologist.   Patient reports feeling weak and fatigued, which may be related to lupus.   Monitoring effectiveness of new medication for symptom management.        Elevated liver enzymes       Noted the patient is seeing a hepatologist due to elevated liver enzymes     Encounter for completion of form with  patient   Completed FMLA x 6 months for 2 days / month x 4 hrs                This note was generated with the assistance of ambient listening technology. Verbal consent was obtained by the patient and accompanying visitor(s) for the recording of patient appointment to facilitate this note. I attest to having reviewed and edited the generated note for accuracy, though some syntax or spelling errors may persist. Please contact the author of this note for any clarification.

## 2025-07-28 ENCOUNTER — OFFICE VISIT (OUTPATIENT)
Dept: HEPATOLOGY | Facility: CLINIC | Age: 55
End: 2025-07-28
Payer: COMMERCIAL

## 2025-07-28 ENCOUNTER — PATIENT MESSAGE (OUTPATIENT)
Dept: HEPATOLOGY | Facility: CLINIC | Age: 55
End: 2025-07-28

## 2025-07-28 DIAGNOSIS — E78.5 HYPERLIPIDEMIA, UNSPECIFIED HYPERLIPIDEMIA TYPE: ICD-10-CM

## 2025-07-28 DIAGNOSIS — I10 BENIGN ESSENTIAL HYPERTENSION: Chronic | ICD-10-CM

## 2025-07-28 DIAGNOSIS — R74.8 ELEVATED LIVER ENZYMES: Primary | ICD-10-CM

## 2025-07-28 PROBLEM — R14.0 BLOATING: Status: ACTIVE | Noted: 2024-02-01

## 2025-07-28 PROBLEM — F43.0 ACUTE STRESS DISORDER: Status: ACTIVE | Noted: 2024-03-06

## 2025-07-28 PROBLEM — Z80.0 FAMILY HISTORY OF GASTRIC CANCER: Status: ACTIVE | Noted: 2022-08-14

## 2025-07-28 PROBLEM — N95.1 HOT FLASH, MENOPAUSAL: Status: ACTIVE | Noted: 2021-01-27

## 2025-07-28 PROBLEM — R79.89 ELEVATED LFTS: Status: ACTIVE | Noted: 2018-07-11

## 2025-07-28 PROBLEM — F41.9 ANXIETY: Status: ACTIVE | Noted: 2018-06-20

## 2025-07-28 PROBLEM — N17.9 AKI (ACUTE KIDNEY INJURY): Status: ACTIVE | Noted: 2020-09-30

## 2025-07-28 PROBLEM — E66.3 OVERWEIGHT WITH BODY MASS INDEX (BMI) OF 27 TO 27.9 IN ADULT: Chronic | Status: ACTIVE | Noted: 2023-09-26

## 2025-07-28 PROBLEM — D72.819 LEUKOPENIA: Status: ACTIVE | Noted: 2018-07-11

## 2025-07-28 PROBLEM — E55.9 VITAMIN D DEFICIENCY: Chronic | Status: ACTIVE | Noted: 2020-09-30

## 2025-07-28 PROCEDURE — 3044F HG A1C LEVEL LT 7.0%: CPT | Mod: CPTII,95,, | Performed by: NURSE PRACTITIONER

## 2025-07-28 PROCEDURE — 1159F MED LIST DOCD IN RCRD: CPT | Mod: CPTII,95,, | Performed by: NURSE PRACTITIONER

## 2025-07-28 PROCEDURE — 98001 SYNCH AUDIO-VIDEO NEW LOW 30: CPT | Mod: 95,,, | Performed by: NURSE PRACTITIONER

## 2025-07-28 PROCEDURE — 4010F ACE/ARB THERAPY RXD/TAKEN: CPT | Mod: CPTII,95,, | Performed by: NURSE PRACTITIONER

## 2025-07-28 PROCEDURE — 1160F RVW MEDS BY RX/DR IN RCRD: CPT | Mod: CPTII,95,, | Performed by: NURSE PRACTITIONER

## 2025-07-28 RX ORDER — DESONIDE 0.5 MG/G
CREAM TOPICAL 2 TIMES DAILY
COMMUNITY
Start: 2025-07-17

## 2025-07-28 RX ORDER — LEVOCETIRIZINE DIHYDROCHLORIDE 5 MG/1
5 TABLET, FILM COATED ORAL
COMMUNITY
Start: 2025-07-26

## 2025-07-28 RX ORDER — PIMECROLIMUS 10 MG/G
CREAM TOPICAL 2 TIMES DAILY
COMMUNITY
Start: 2025-07-16

## 2025-07-28 NOTE — PROGRESS NOTES
The patient location is: [place]  The chief complaint leading to consultation is: Elevated liver enzymes    Visit type: audiovisual    30 minutes of total time spent on the encounter, which includes face to face time and non-face to face time preparing to see the patient (eg, review of tests), Obtaining and/or reviewing separately obtained history, Documenting clinical information in the electronic or other health record, Independently interpreting results (not separately reported) and communicating results to the patient/family/caregiver, or Care coordination (not separately reported).     Each patient to whom he or she provides medical services by telemedicine is:  (1) informed of the relationship between the physician and patient and the respective role of any other health care provider with respect to management of the patient; and (2) notified that he or she may decline to receive medical services by telemedicine and may withdraw from such care at any time.    Notes:     [facility] New Patient Visit    Reason for Visit: Elevated liver enzymes    PCP: Taylor Peters    HPI:  This is a 54 y.o. female with PMH noted below, here for evaluation of elevated liver enzymes.    The patient's risk factors for fatty liver disease include:     Overweight/Obesity                     Yes; BMI 28.58  Dyslipidemia                                Yes; Prescribed Atorvastatin 10 mg daily - Refer to most recent lipid panel results below:   Latest Reference Range & Units 06/30/25 08:07   Cholesterol Total 120 - 199 mg/dL 254 (H)   HDL 40 - 75 mg/dL 81 (H)   HDL/Cholesterol Ratio 20.0 - 50.0 % 31.9   Non-HDL Cholesterol mg/dL 173   Total Cholesterol/HDL Ratio 2.0 - 5.0  3.1   Triglycerides 30 - 150 mg/dL 110   LDL Cholesterol 63.0 - 159.0 mg/dL 151.0     Insulin resistance/Diabetes         No; Last HgbA1c was 5.5% (6/2025)    She has had elevated liver enzymes in a hepatocellular pattern since at least 6/2018, per  review of outside records.    PMH is significant for Discoid Lupus, previously on Plaquenil.    She has not undergone any recent abdominal imaging.    She has never undergone a liver biopsy or non-invasive staging exam.    FIB-4 Calculation: 0.54 at 6/30/2025  8:07 AM  Calculated from:  SGOT/AST: 33 unit/L at 6/30/2025  8:07 AM  SGPT/ALT: 56 unit/L at 6/30/2025  8:07 AM  Platelets: 443 K/uL at 6/30/2025  8:07 AM  Age: 54 years     FIB-4 below 1.30 is considered as low-risk for advanced fibrosis  FIB-4 over 2.67 is considered as high-risk for advanced fibrosis  FIB-4 values between 1.30 and 2.67 are considered as intermediate-risk of advanced fibrosis for ages 36-64.     For ages > 64 the cut-off for low-risk goes to < 2.  This is a screening tool and clinical judgement should be used in the interpretation of these results.    She has no known family history of liver disease. She does have a known family history of SLE (1st cousin). She denies any history of heavy alcohol intake and does not use illicit drugs. HBV, HCV and HIV negative on prior labs.     She is well appearing, and has no signs or symptoms of hepatic decompensation including jaundice, dark urine, pruritus, abdominal distention, lower extremity edema, hematemesis, melena, or periods of confusion suggestive of hepatic encephalopathy.      PMHX:  has a past medical history of Hypertension.    PSHX:  has no past surgical history on file.    The patient's social and family histories were reviewed by me and updated in the appropriate section of the electronic medical record.    Review of patient's allergies indicates:   Allergen Reactions    Ace inhibitors      Cough, imbalance    Penicillins Itching    Aldactazide [spironolacton-hydrochlorothiaz] Rash     Medications Ordered Prior to Encounter[1]    SOCIAL HISTORY:   Tobacco Use History[2]    Social History     Substance and Sexual Activity   Alcohol Use Yes    Alcohol/week: 2.0 standard drinks of alcohol     Types: 2 Glasses of wine per week     Social History     Substance and Sexual Activity   Drug Use Not on file     ROS:   GENERAL: Denies fever, chills, weight loss/gain, fatigue  HEENT: Denies headaches, dizziness, vision/hearing changes  CARDIOVASCULAR: Denies chest pain, palpitations, or edema  RESPIRATORY: Denies dyspnea, cough  GI: Denies abdominal pain, rectal bleeding, nausea, vomiting. No change in bowel pattern or color  : Denies dysuria, hematuria   SKIN: Denies rash, itching   NEURO: Denies confusion, memory loss, or mood changes  PSYCH: Denies depression or anxiety  HEME/LYMPH: Denies easy bruising or bleeding    Objective Findings:    PHYSICAL EXAM:   Friendly Black or  female, in no acute distress; alert and oriented to person, place and time  VITALS: There were no vitals taken for this visit.  HENT: Normocephalic, without obvious abnormality.   EYES: Sclerae anicteric.   NECK: No obvious masses.  CARDIOVASCULAR: BRIA.  RESPIRATORY: Normal respiratory effort.   GI: BRIA.  EXTREMITIES: BRIA.  SKIN: No jaundice. No rashes noted to exposed skin.   NEURO: No asterixis.  PSYCH:  Memory intact. Thought and speech pattern appropriate. Behavior normal. No depression or anxiety noted.    DIAGNOSTIC STUDIES:    ABD. U/S & FIBROSCAN -     EDUCATION:  Per AVS.    ASSESSMENT & PLAN:  54 y.o. Black or  female with:    1. Elevated liver enzymes  Ambulatory referral/consult to Hepatology    FibroScan Transplant Hepatology(Vibration Controlled Transient Elastography)    Hepatic Function Panel    US Abdomen Complete    Hepatitis A antibody, IgG    Hepatitis B Core Antibody, Total    Hepatitis B Surface Antibody, Qual/Quant      2. Benign essential hypertension  FibroScan Transplant Hepatology(Vibration Controlled Transient Elastography)    Hepatic Function Panel    US Abdomen Complete    Hepatitis A antibody, IgG    Hepatitis B Core Antibody, Total    Hepatitis B Surface Antibody,  Qual/Quant      3. Hyperlipidemia, unspecified hyperlipidemia type  FibroScan Transplant Hepatology(Vibration Controlled Transient Elastography)    Hepatic Function Panel    US Abdomen Complete    Hepatitis A antibody, IgG    Hepatitis B Core Antibody, Total    Hepatitis B Surface Antibody, Qual/Quant          - Schedule Fibroscan to non-invasively stage liver disease.  - Schedule Ultrasound of the liver now.  - Repeat liver function tests in 3-6 months.  - Recommend weight loss goal of 15-20 lbs, through diet and exercise.  - Recommend good control of cholesterol, blood pressure, & blood sugar levels.  - Avoid herbal supplements/alternative remedies.  - Limit alcohol intake to no more than 5-7 standard drinks per week, ideally less.  - Return to clinic TBD by Fibroscan, US and lab results.     Thank you for allowing me to participate in the care of Medinacorbin Ryan.       Hepatology Nurse Practitioner  [facility]    CC'ed note to:   Taylor Peters MD Phan, Dianna, MD             [1]   Current Outpatient Medications on File Prior to Visit   Medication Sig Dispense Refill    ALPRAZolam (XANAX) 1 MG tablet Take 1 mg by mouth every 12 (twelve) hours as needed.      amitriptyline (ELAVIL) 25 MG tablet TAKE 1 TABLET BY MOUTH NIGHTLY AS NEEDED FOR INSOMNIA. 30 tablet 2    atorvastatin (LIPITOR) 10 MG tablet Take 1 tablet (10 mg total) by mouth once daily. 90 tablet 3    clindamycin phosphate 1% 1 % gel Apply topically 2 (two) times daily.      desonide 0.05% (DESOWEN) 0.05 % Oint Apply topically.      estradiol 0.05 mg/24 hr td ptsw (VIVELLE-DOT) 0.05 mg/24 hr Place 1 patch onto the skin twice a week.      fluocinonide (LIDEX) 0.05 % ointment Apply topically 2 (two) times daily.      hydroxychloroquine (PLAQUENIL) 200 mg tablet Take 200 mg by mouth.      ketoconazole (NIZORAL) 2 % shampoo Apply topically every other day.      olmesartan (BENICAR) 40 MG tablet Take 1 tablet  (40 mg total) by mouth once daily. 90 tablet 3    progesterone (PROMETRIUM) 100 MG capsule Take 100 mg by mouth every evening.      triamcinolone acetonide 0.1% (KENALOG) 0.1 % ointment Apply topically 2 (two) times daily.      venlafaxine (EFFEXOR-XR) 75 MG 24 hr capsule Take 1 capsule (75 mg total) by mouth once daily. 30 capsule 11     No current facility-administered medications on file prior to visit.   [2]   Social History  Tobacco Use   Smoking Status Never   Smokeless Tobacco Never

## 2025-07-28 NOTE — Clinical Note
Please contact patient to schedule US and Fibroscan now at main campus, same day, along with f/u labs in 3-6 months. Orders in Epic. RTC TBD. Thanks!

## 2025-08-05 ENCOUNTER — PATIENT MESSAGE (OUTPATIENT)
Dept: FAMILY MEDICINE | Facility: CLINIC | Age: 55
End: 2025-08-05
Payer: COMMERCIAL

## 2025-08-05 ENCOUNTER — TELEPHONE (OUTPATIENT)
Dept: DERMATOLOGY | Facility: CLINIC | Age: 55
End: 2025-08-05
Payer: COMMERCIAL

## 2025-08-05 NOTE — TELEPHONE ENCOUNTER
Copied from CRM #[id number]. Topic: Appointments - Appointment Scheduling  >> Aug 5, 2025 12:38 PM Jade wrote:  Type:  Appointment Request         Name of Caller:pt  When is the first available appointment?No access  Symptoms:skin check   Would the patient rather a call back or a response via [facility]? call  Best Call Back Number:[phone]   Additional Information: Pt calleed to est care please contact pt with further information    Appointment has been made per pt request

## 2025-08-14 ENCOUNTER — OFFICE VISIT (OUTPATIENT)
Dept: CARDIOLOGY | Facility: CLINIC | Age: 55
End: 2025-08-14
Payer: COMMERCIAL

## 2025-08-14 ENCOUNTER — LAB VISIT (OUTPATIENT)
Dept: LAB | Facility: HOSPITAL | Age: 55
End: 2025-08-14
Attending: STUDENT IN AN ORGANIZED HEALTH CARE EDUCATION/TRAINING PROGRAM
Payer: COMMERCIAL

## 2025-08-14 ENCOUNTER — TELEPHONE (OUTPATIENT)
Dept: CARDIOLOGY | Facility: CLINIC | Age: 55
End: 2025-08-14
Payer: COMMERCIAL

## 2025-08-14 ENCOUNTER — OFFICE VISIT (OUTPATIENT)
Dept: RHEUMATOLOGY | Facility: CLINIC | Age: 55
End: 2025-08-14
Payer: COMMERCIAL

## 2025-08-14 VITALS
WEIGHT: 190.69 LBS | SYSTOLIC BLOOD PRESSURE: 218 MMHG | OXYGEN SATURATION: 99 % | TEMPERATURE: 99 F | HEART RATE: 118 BPM | DIASTOLIC BLOOD PRESSURE: 122 MMHG | BODY MASS INDEX: 28.24 KG/M2 | HEIGHT: 69 IN

## 2025-08-14 VITALS
BODY MASS INDEX: 28.28 KG/M2 | HEIGHT: 69 IN | SYSTOLIC BLOOD PRESSURE: 168 MMHG | WEIGHT: 190.94 LBS | DIASTOLIC BLOOD PRESSURE: 94 MMHG | OXYGEN SATURATION: 100 % | HEART RATE: 79 BPM

## 2025-08-14 DIAGNOSIS — L93.0 DISCOID LUPUS: ICD-10-CM

## 2025-08-14 DIAGNOSIS — E78.5 HYPERLIPIDEMIA, UNSPECIFIED HYPERLIPIDEMIA TYPE: ICD-10-CM

## 2025-08-14 DIAGNOSIS — N18.31 STAGE 3A CHRONIC KIDNEY DISEASE: ICD-10-CM

## 2025-08-14 DIAGNOSIS — M25.561 CHRONIC PAIN OF RIGHT KNEE: Primary | ICD-10-CM

## 2025-08-14 DIAGNOSIS — G89.29 CHRONIC PAIN OF RIGHT KNEE: Primary | ICD-10-CM

## 2025-08-14 DIAGNOSIS — R53.83 FATIGUE, UNSPECIFIED TYPE: ICD-10-CM

## 2025-08-14 DIAGNOSIS — J34.9 SINUS DISORDER: ICD-10-CM

## 2025-08-14 DIAGNOSIS — I10 HYPERTENSION, UNCONTROLLED: Primary | ICD-10-CM

## 2025-08-14 DIAGNOSIS — R74.8 ABNORMAL LIVER ENZYMES: ICD-10-CM

## 2025-08-14 DIAGNOSIS — R94.31 NONSPECIFIC ABNORMAL ELECTROCARDIOGRAM (ECG) (EKG): ICD-10-CM

## 2025-08-14 LAB
C3 SERPL-MCNC: 141 MG/DL (ref 50–180)
C4 COMPLEMENT (OHS): 28 MG/DL (ref 11–44)
CRP SERPL-MCNC: 1.9 MG/L
CYCLIC CITRULLINATED PEPTIDE (CCP) (OHS): 0.9 U/ML
ERYTHROCYTE [SEDIMENTATION RATE] IN BLOOD BY PHOTOMETRIC METHOD: 101 MM/HR
RHEUMATOID FACT SERPL-ACNC: <13 IU/ML

## 2025-08-14 PROCEDURE — 86038 ANTINUCLEAR ANTIBODIES: CPT

## 2025-08-14 PROCEDURE — 82787 IGG 1 2 3 OR 4 EACH: CPT

## 2025-08-14 PROCEDURE — 82164 ANGIOTENSIN I ENZYME TEST: CPT

## 2025-08-14 PROCEDURE — 1159F MED LIST DOCD IN RCRD: CPT | Mod: CPTII,S$GLB,, | Performed by: STUDENT IN AN ORGANIZED HEALTH CARE EDUCATION/TRAINING PROGRAM

## 2025-08-14 PROCEDURE — 3079F DIAST BP 80-89 MM HG: CPT | Mod: CPTII,S$GLB,,

## 2025-08-14 PROCEDURE — 86431 RHEUMATOID FACTOR QUANT: CPT

## 2025-08-14 PROCEDURE — 3077F SYST BP >= 140 MM HG: CPT | Mod: CPTII,S$GLB,, | Performed by: STUDENT IN AN ORGANIZED HEALTH CARE EDUCATION/TRAINING PROGRAM

## 2025-08-14 PROCEDURE — 83516 IMMUNOASSAY NONANTIBODY: CPT | Mod: 59

## 2025-08-14 PROCEDURE — 86146 BETA-2 GLYCOPROTEIN ANTIBODY: CPT

## 2025-08-14 PROCEDURE — 85652 RBC SED RATE AUTOMATED: CPT

## 2025-08-14 PROCEDURE — 86140 C-REACTIVE PROTEIN: CPT

## 2025-08-14 PROCEDURE — 3077F SYST BP >= 140 MM HG: CPT | Mod: CPTII,S$GLB,,

## 2025-08-14 PROCEDURE — 3008F BODY MASS INDEX DOCD: CPT | Mod: CPTII,S$GLB,, | Performed by: STUDENT IN AN ORGANIZED HEALTH CARE EDUCATION/TRAINING PROGRAM

## 2025-08-14 PROCEDURE — 86225 DNA ANTIBODY NATIVE: CPT

## 2025-08-14 PROCEDURE — 86235 NUCLEAR ANTIGEN ANTIBODY: CPT | Mod: 59

## 2025-08-14 PROCEDURE — 1159F MED LIST DOCD IN RCRD: CPT | Mod: CPTII,S$GLB,,

## 2025-08-14 PROCEDURE — 83516 IMMUNOASSAY NONANTIBODY: CPT

## 2025-08-14 PROCEDURE — 86160 COMPLEMENT ANTIGEN: CPT | Mod: 59

## 2025-08-14 PROCEDURE — 86200 CCP ANTIBODY: CPT

## 2025-08-14 PROCEDURE — 36415 COLL VENOUS BLD VENIPUNCTURE: CPT

## 2025-08-14 PROCEDURE — 99205 OFFICE O/P NEW HI 60 MIN: CPT | Mod: S$GLB,,, | Performed by: STUDENT IN AN ORGANIZED HEALTH CARE EDUCATION/TRAINING PROGRAM

## 2025-08-14 PROCEDURE — 99205 OFFICE O/P NEW HI 60 MIN: CPT | Mod: S$GLB,,,

## 2025-08-14 PROCEDURE — 3080F DIAST BP >= 90 MM HG: CPT | Mod: CPTII,S$GLB,, | Performed by: STUDENT IN AN ORGANIZED HEALTH CARE EDUCATION/TRAINING PROGRAM

## 2025-08-14 PROCEDURE — 1160F RVW MEDS BY RX/DR IN RCRD: CPT | Mod: CPTII,S$GLB,, | Performed by: STUDENT IN AN ORGANIZED HEALTH CARE EDUCATION/TRAINING PROGRAM

## 2025-08-14 PROCEDURE — 86036 ANCA SCREEN EACH ANTIBODY: CPT

## 2025-08-14 PROCEDURE — 4010F ACE/ARB THERAPY RXD/TAKEN: CPT | Mod: CPTII,S$GLB,, | Performed by: STUDENT IN AN ORGANIZED HEALTH CARE EDUCATION/TRAINING PROGRAM

## 2025-08-14 PROCEDURE — 3008F BODY MASS INDEX DOCD: CPT | Mod: CPTII,S$GLB,,

## 2025-08-14 PROCEDURE — 86235 NUCLEAR ANTIGEN ANTIBODY: CPT

## 2025-08-14 PROCEDURE — 3044F HG A1C LEVEL LT 7.0%: CPT | Mod: CPTII,S$GLB,, | Performed by: STUDENT IN AN ORGANIZED HEALTH CARE EDUCATION/TRAINING PROGRAM

## 2025-08-14 PROCEDURE — G2211 COMPLEX E/M VISIT ADD ON: HCPCS | Mod: S$GLB,,, | Performed by: STUDENT IN AN ORGANIZED HEALTH CARE EDUCATION/TRAINING PROGRAM

## 2025-08-14 PROCEDURE — 4010F ACE/ARB THERAPY RXD/TAKEN: CPT | Mod: CPTII,S$GLB,,

## 2025-08-14 PROCEDURE — 99999 PR PBB SHADOW E&M-EST. PATIENT-LVL III: CPT | Mod: PBBFAC,,,

## 2025-08-14 PROCEDURE — 86147 CARDIOLIPIN ANTIBODY EA IG: CPT

## 2025-08-14 PROCEDURE — 3044F HG A1C LEVEL LT 7.0%: CPT | Mod: CPTII,S$GLB,,

## 2025-08-14 PROCEDURE — 83520 IMMUNOASSAY QUANT NOS NONAB: CPT

## 2025-08-14 PROCEDURE — 99999 PR PBB SHADOW E&M-EST. PATIENT-LVL V: CPT | Mod: PBBFAC,,, | Performed by: STUDENT IN AN ORGANIZED HEALTH CARE EDUCATION/TRAINING PROGRAM

## 2025-08-14 RX ORDER — NIFEDIPINE 30 MG/1
30 TABLET, EXTENDED RELEASE ORAL DAILY
Qty: 30 TABLET | Refills: 11 | Status: SHIPPED | OUTPATIENT
Start: 2025-08-14 | End: 2026-08-14

## 2025-08-15 ENCOUNTER — E-VISIT (OUTPATIENT)
Dept: FAMILY MEDICINE | Facility: CLINIC | Age: 55
End: 2025-08-15
Payer: COMMERCIAL

## 2025-08-15 DIAGNOSIS — J32.9 CHRONIC SINUSITIS, UNSPECIFIED LOCATION: Primary | ICD-10-CM

## 2025-08-15 LAB
ANA (OHS): NORMAL
DSDNA ANTIBODY (OHS): NORMAL
DSDNA ANTIBODY TITER (OHS): NORMAL

## 2025-08-15 RX ORDER — FLUTICASONE PROPIONATE 50 MCG
1 SPRAY, SUSPENSION (ML) NASAL DAILY
Qty: 16 G | Refills: 5 | Status: SHIPPED | OUTPATIENT
Start: 2025-08-15

## 2025-08-15 RX ORDER — LEVOCETIRIZINE DIHYDROCHLORIDE 5 MG/1
5 TABLET, FILM COATED ORAL NIGHTLY
Qty: 30 TABLET | Refills: 11 | Status: SHIPPED | OUTPATIENT
Start: 2025-08-15 | End: 2026-08-15

## 2025-08-16 LAB
ACE SERPL-CCNC: 63 U/L (ref 16–85)
CENTROMERE IGG SER-ACNC: <0.2 U
PROTEINASE3 IGG SERPL IA-ACNC: <0.2 U

## 2025-08-17 LAB
AR INTERLEUKIN 2 RECEPTOR, SOLUBLE, SERUM: 300.7 PG/ML
HISTONE IGG SER IA-ACNC: 0.4 UNITS

## 2025-08-18 ENCOUNTER — E-VISIT (OUTPATIENT)
Dept: CARDIOLOGY | Facility: CLINIC | Age: 55
End: 2025-08-18
Payer: COMMERCIAL

## 2025-08-18 DIAGNOSIS — I10 HYPERTENSION, UNCONTROLLED: Primary | ICD-10-CM

## 2025-08-18 LAB
W BETA-2 GLYCOPROTEIN 1 IGA AB: 19 U/ML
W BETA-2 GLYCOPROTEIN 1 IGG AB: 2.1 U/ML
W BETA-2 GLYCOPROTEIN 1 IGM AB: 410 U/ML
W C-ANCA: NORMAL TITER
W CARDIOLIPIN IGG AB: 1.2 GPL
W CARDIOLIPIN IGM AB: 3.9 MPL
W IMMUNOGLOBULIN G SUBCLASS 4: 66 MG/DL
W MYELOPEROXIDASE (MPO) AB: 0.3 U/ML
W P-ANCA: NORMAL TITER
W RNA POLYMERASE III: <0.7 U/ML
W SCLERODERMA (SCL-70) ANTIBODY: 0.9 U/ML

## 2025-08-18 RX ORDER — CARVEDILOL 6.25 MG/1
6.25 TABLET ORAL 2 TIMES DAILY
Qty: 60 TABLET | Refills: 3 | Status: SHIPPED | OUTPATIENT
Start: 2025-08-18

## 2025-08-25 ENCOUNTER — PATIENT MESSAGE (OUTPATIENT)
Dept: RHEUMATOLOGY | Facility: CLINIC | Age: 55
End: 2025-08-25
Payer: COMMERCIAL

## 2025-08-25 DIAGNOSIS — N17.9 AKI (ACUTE KIDNEY INJURY): ICD-10-CM

## 2025-08-25 DIAGNOSIS — L93.0 DISCOID LUPUS: Primary | ICD-10-CM

## 2025-08-26 ENCOUNTER — APPOINTMENT (OUTPATIENT)
Dept: LAB | Facility: HOSPITAL | Age: 55
End: 2025-08-26
Attending: STUDENT IN AN ORGANIZED HEALTH CARE EDUCATION/TRAINING PROGRAM
Payer: COMMERCIAL

## 2025-08-27 ENCOUNTER — HOSPITAL ENCOUNTER (OUTPATIENT)
Dept: CARDIOLOGY | Facility: HOSPITAL | Age: 55
Discharge: HOME | End: 2025-08-27
Payer: COMMERCIAL

## 2025-08-27 VITALS — WEIGHT: 190.94 LBS | HEIGHT: 69 IN | BODY MASS INDEX: 28.28 KG/M2

## 2025-08-27 DIAGNOSIS — I10 HYPERTENSION, UNCONTROLLED: ICD-10-CM

## 2025-08-27 DIAGNOSIS — R94.31 NONSPECIFIC ABNORMAL ELECTROCARDIOGRAM (ECG) (EKG): ICD-10-CM

## 2025-08-27 LAB
AORTIC SIZE INDEX (SOV): 1.5 CM/M2
AORTIC SIZE INDEX: 1.4 CM/M2
ASCENDING AORTA: 2.9 CM
AV INDEX (PROSTH): 0.66
AV MEAN GRADIENT: 4 MMHG
AV PEAK GRADIENT: 6 MMHG
AV VALVE AREA BY VELOCITY RATIO: 2.1 CM²
AV VALVE AREA: 2.1 CM²
AV VELOCITY RATIO: 0.67
BSA FOR ECHO PROCEDURE: 2.05 M2
CV ECHO LV RWT: 0.71 CM
DOP CALC AO PEAK VEL: 1.2 M/S
DOP CALC AO VTI: 23.2 CM
DOP CALC LVOT AREA: 3.1 CM2
DOP CALC LVOT DIAMETER: 2 CM
DOP CALC LVOT PEAK VEL: 0.8 M/S
DOP CALCLVOT PEAK VEL VTI: 15.3 CM
E WAVE DECELERATION TIME: 158 MSEC
E/A RATIO: 0.62
E/E' RATIO: 8 M/S
ECHO LV POSTERIOR WALL: 1.2 CM (ref 0.6–1.1)
FRACTIONAL SHORTENING: 26.5 % (ref 28–44)
INTERVENTRICULAR SEPTUM: 1.1 CM (ref 0.6–1.1)
IVC DIAMETER: 1.08 CM
IVRT: 95 MSEC
LA MAJOR: 4.7 CM
LA MINOR: 4.7 CM
LA WIDTH: 3.2 CM
LEFT ATRIUM SIZE: 3.3 CM
LEFT ATRIUM VOLUME INDEX: 21 ML/M2
LEFT ATRIUM VOLUME: 42 CM3
LEFT INTERNAL DIMENSION IN SYSTOLE: 2.5 CM (ref 2.1–4)
LEFT VENTRICLE DIASTOLIC VOLUME INDEX: 24.14 ML/M2
LEFT VENTRICLE DIASTOLIC VOLUME: 49 ML
LEFT VENTRICLE MASS INDEX: 60.1 G/M2
LEFT VENTRICLE SYSTOLIC VOLUME INDEX: 11.3 ML/M2
LEFT VENTRICLE SYSTOLIC VOLUME: 23 ML
LEFT VENTRICULAR INTERNAL DIMENSION IN DIASTOLE: 3.4 CM (ref 3.5–6)
LEFT VENTRICULAR MASS: 122 G
LV LATERAL E/E' RATIO: 6.3 M/S
LV SEPTAL E/E' RATIO: 10 M/S
LVED V (TEICH): 48.63 ML
LVES V (TEICH): 22.64 ML
LVOT MG: 1.73 MMHG
LVOT MV: 0.63 CM/S
Lab: 1.7 CM/M
Lab: 1.7 CM/M
MV PEAK A VEL: 0.81 M/S
MV PEAK E VEL: 0.5 M/S
MV STENOSIS PRESSURE HALF TIME: 45.79 MS
MV VALVE AREA P 1/2 METHOD: 4.8 CM2
OHS CV CPX PATIENT HEIGHT IN: 69
OHS CV RV/LV RATIO: 0.91 CM
PISA TR MAX VEL: 2.2 M/S
PULM VEIN S/D RATIO: 1.3
PV PEAK D VEL: 0.37 M/S
PV PEAK GRADIENT: 5 MMHG
PV PEAK S VEL: 0.48 M/S
PV PEAK VELOCITY: 1.07 M/S
RA MAJOR: 4.15 CM
RA PRESSURE ESTIMATED: 3 MMHG
RA WIDTH: 3.3 CM
RIGHT VENTRICLE DIASTOLIC BASEL DIMENSION: 3.1 CM
RIGHT VENTRICULAR END-DIASTOLIC DIMENSION: 3.11 CM
RV TB RVSP: 5 MMHG
RV TISSUE DOPPLER FREE WALL SYSTOLIC VELOCITY 1 (APICAL 4 CHAMBER VIEW): 15.82 CM/S
SINUS: 3 CM
STJ: 2.6 CM
TDI LATERAL: 0.08 M/S
TDI SEPTAL: 0.05 M/S
TDI: 0.07 M/S
TR MAX PG: 20 MMHG
TRICUSPID ANNULAR PLANE SYSTOLIC EXCURSION: 2 CM
TV PEAK GRADIENT: 1 MMHG
TV REST PULMONARY ARTERY PRESSURE: 22 MMHG
Z-SCORE OF LEFT VENTRICULAR DIMENSION IN END DIASTOLE: -5.71
Z-SCORE OF LEFT VENTRICULAR DIMENSION IN END SYSTOLE: -3.07

## 2025-08-27 PROCEDURE — 93306 TTE W/DOPPLER COMPLETE: CPT | Mod: 26,,, | Performed by: INTERNAL MEDICINE

## 2025-08-27 PROCEDURE — 93306 TTE W/DOPPLER COMPLETE: CPT

## 2025-08-28 ENCOUNTER — OFFICE VISIT (OUTPATIENT)
Dept: CARDIOLOGY | Facility: CLINIC | Age: 55
End: 2025-08-28
Payer: COMMERCIAL

## 2025-08-28 VITALS
SYSTOLIC BLOOD PRESSURE: 160 MMHG | WEIGHT: 185 LBS | BODY MASS INDEX: 27.4 KG/M2 | DIASTOLIC BLOOD PRESSURE: 105 MMHG | HEIGHT: 69 IN

## 2025-08-28 DIAGNOSIS — I10 BENIGN ESSENTIAL HYPERTENSION: Chronic | ICD-10-CM

## 2025-08-28 DIAGNOSIS — I10 HYPERTENSION, UNCONTROLLED: Primary | ICD-10-CM

## 2025-08-28 DIAGNOSIS — R00.2 PALPITATIONS: Chronic | ICD-10-CM

## 2025-08-28 DIAGNOSIS — E78.5 HYPERLIPIDEMIA, UNSPECIFIED HYPERLIPIDEMIA TYPE: ICD-10-CM

## 2025-08-28 PROBLEM — R94.31 NONSPECIFIC ABNORMAL ELECTROCARDIOGRAM (ECG) (EKG): Status: RESOLVED | Noted: 2025-08-14 | Resolved: 2025-08-28

## 2025-08-28 PROCEDURE — 98005 SYNCH AUDIO-VIDEO EST LOW 20: CPT | Mod: 95,,,

## 2025-08-28 PROCEDURE — 3077F SYST BP >= 140 MM HG: CPT | Mod: CPTII,95,,

## 2025-08-28 PROCEDURE — 3044F HG A1C LEVEL LT 7.0%: CPT | Mod: CPTII,95,,

## 2025-08-28 PROCEDURE — 3080F DIAST BP >= 90 MM HG: CPT | Mod: CPTII,95,,

## 2025-08-28 PROCEDURE — 1159F MED LIST DOCD IN RCRD: CPT | Mod: CPTII,95,,

## 2025-08-28 PROCEDURE — 4010F ACE/ARB THERAPY RXD/TAKEN: CPT | Mod: CPTII,95,,

## 2025-08-28 RX ORDER — DEUCRAVACITINIB 6 MG/1
1 TABLET, FILM COATED ORAL
COMMUNITY
Start: 2025-08-27

## 2025-08-28 RX ORDER — ESCITALOPRAM OXALATE 10 MG/1
10 TABLET ORAL
COMMUNITY
Start: 2025-08-27 | End: 2026-08-27

## 2025-08-29 RX ORDER — MYCOPHENOLATE MOFETIL 500 MG/1
500 TABLET ORAL 2 TIMES DAILY
Qty: 60 TABLET | Refills: 5 | Status: SHIPPED | OUTPATIENT
Start: 2025-08-29 | End: 2026-02-25

## 2025-09-02 ENCOUNTER — PATIENT MESSAGE (OUTPATIENT)
Dept: ADMINISTRATIVE | Facility: HOSPITAL | Age: 55
End: 2025-09-02
Payer: COMMERCIAL

## 2025-09-03 ENCOUNTER — PATIENT OUTREACH (OUTPATIENT)
Dept: ADMINISTRATIVE | Facility: HOSPITAL | Age: 55
End: 2025-09-03
Payer: COMMERCIAL

## 2025-09-03 DIAGNOSIS — I10 HYPERTENSION, UNCONTROLLED: ICD-10-CM

## 2025-09-03 RX ORDER — VALSARTAN AND HYDROCHLOROTHIAZIDE 160; 12.5 MG/1; MG/1
1 TABLET, FILM COATED ORAL DAILY
Qty: 30 TABLET | Refills: 11 | Status: SHIPPED | OUTPATIENT
Start: 2025-09-03

## 2025-09-04 ENCOUNTER — TELEPHONE (OUTPATIENT)
Dept: FAMILY MEDICINE | Facility: CLINIC | Age: 55
End: 2025-09-04
Payer: COMMERCIAL